# Patient Record
Sex: FEMALE | Race: WHITE | Employment: FULL TIME | URBAN - METROPOLITAN AREA
[De-identification: names, ages, dates, MRNs, and addresses within clinical notes are randomized per-mention and may not be internally consistent; named-entity substitution may affect disease eponyms.]

---

## 2017-11-08 ENCOUNTER — OFFICE VISIT (OUTPATIENT)
Dept: INTERNAL MEDICINE CLINIC | Age: 22
End: 2017-11-08

## 2017-11-08 VITALS
SYSTOLIC BLOOD PRESSURE: 116 MMHG | WEIGHT: 177 LBS | DIASTOLIC BLOOD PRESSURE: 75 MMHG | HEIGHT: 63 IN | HEART RATE: 70 BPM | RESPIRATION RATE: 18 BRPM | BODY MASS INDEX: 31.36 KG/M2

## 2017-11-08 DIAGNOSIS — Z00.00 ANNUAL PHYSICAL EXAM: Primary | ICD-10-CM

## 2017-11-08 PROCEDURE — 99395 PREV VISIT EST AGE 18-39: CPT | Performed by: INTERNAL MEDICINE

## 2017-11-08 ASSESSMENT — ENCOUNTER SYMPTOMS
COLOR CHANGE: 0
TROUBLE SWALLOWING: 0
ABDOMINAL PAIN: 0
PHOTOPHOBIA: 0
SHORTNESS OF BREATH: 0
CONSTIPATION: 0
NAUSEA: 0
CHEST TIGHTNESS: 0

## 2017-11-08 ASSESSMENT — PATIENT HEALTH QUESTIONNAIRE - PHQ9
1. LITTLE INTEREST OR PLEASURE IN DOING THINGS: 0
SUM OF ALL RESPONSES TO PHQ QUESTIONS 1-9: 0
SUM OF ALL RESPONSES TO PHQ9 QUESTIONS 1 & 2: 0
2. FEELING DOWN, DEPRESSED OR HOPELESS: 0

## 2017-11-08 NOTE — PROGRESS NOTES
Subjective:      Patient ID: Prabhu Guthrie is a 25 y.o. female. HPI     25 y.o. male here to establish care    No medical issues , never had any surgeries   Takes OCP daily. Employed, going to nursing school. Non smoker, non alcoholic     Mother and father have DM and is worried about her recent weight gain     No previous cardiac or pulm history   No h.o dizziness or syncope  Reports normal health usually, recently placed on amox for dental infection       History reviewed. No pertinent past medical history. History reviewed. No pertinent surgical history. No current outpatient prescriptions on file. No current facility-administered medications for this visit. No Known Allergies  Social History     Social History    Marital status: Single     Spouse name: N/A    Number of children: N/A    Years of education: N/A     Occupational History    Not on file. Social History Main Topics    Smoking status: Never Smoker    Smokeless tobacco: Never Used    Alcohol use Yes      Comment: special occasions    Drug use: No    Sexual activity: Yes     Other Topics Concern    Not on file     Social History Narrative    No narrative on file     Family History   Problem Relation Age of Onset    Anemia Mother     Diabetes Mother     High Blood Pressure Mother     Diabetes Father     High Blood Pressure Father        Review of Systems   Constitutional: Negative for activity change, diaphoresis and unexpected weight change. HENT: Negative for congestion, ear discharge, hearing loss and trouble swallowing. Eyes: Negative for photophobia and visual disturbance. Respiratory: Negative for chest tightness and shortness of breath. Cardiovascular: Negative for chest pain and palpitations. Gastrointestinal: Negative for abdominal pain, constipation and nausea. Endocrine: Negative for cold intolerance, heat intolerance and polyuria.    Genitourinary: Negative for dysuria, flank pain

## 2018-04-12 PROBLEM — Z00.00 ANNUAL PHYSICAL EXAM: Status: RESOLVED | Noted: 2017-11-08 | Resolved: 2018-04-12

## 2019-01-29 ENCOUNTER — OFFICE VISIT (OUTPATIENT)
Dept: INTERNAL MEDICINE CLINIC | Age: 24
End: 2019-01-29

## 2019-01-29 VITALS
BODY MASS INDEX: 29.71 KG/M2 | TEMPERATURE: 97.6 F | DIASTOLIC BLOOD PRESSURE: 65 MMHG | WEIGHT: 174 LBS | SYSTOLIC BLOOD PRESSURE: 110 MMHG | HEIGHT: 64 IN

## 2019-01-29 DIAGNOSIS — M54.50 ACUTE MIDLINE LOW BACK PAIN WITHOUT SCIATICA: Primary | ICD-10-CM

## 2019-01-29 PROCEDURE — 99213 OFFICE O/P EST LOW 20 MIN: CPT | Performed by: PHYSICIAN ASSISTANT

## 2019-01-29 RX ORDER — MELOXICAM 15 MG/1
15 TABLET ORAL DAILY
Qty: 30 TABLET | Refills: 0 | Status: SHIPPED | OUTPATIENT
Start: 2019-01-29 | End: 2020-04-09 | Stop reason: ALTCHOICE

## 2019-01-29 ASSESSMENT — ENCOUNTER SYMPTOMS
ABDOMINAL PAIN: 0
VOMITING: 0
BACK PAIN: 1
COUGH: 0
NAUSEA: 0

## 2020-04-09 ENCOUNTER — VIRTUAL VISIT (OUTPATIENT)
Dept: INTERNAL MEDICINE CLINIC | Age: 25
End: 2020-04-09

## 2020-04-09 PROCEDURE — 99213 OFFICE O/P EST LOW 20 MIN: CPT | Performed by: INTERNAL MEDICINE

## 2020-04-09 NOTE — PROGRESS NOTES
motion of neck        [] Abnormal-       Neurological:        [x] No Facial Asymmetry (Cranial nerve 7 motor function) (limited exam to video visit)          [x] No gaze palsy        [] Abnormal-         Skin:        [] No significant exanthematous lesions or discoloration noted on facial skin         [] Abnormal-            Psychiatric:       [x] Normal Affect [x] No Hallucinations        [] Abnormal-     Other pertinent observable physical exam findings-     ASSESSMENT/PLAN:     Diagnosis Orders   1. 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 Frequent hand washing     Lynda Borges is a 25 y.o. female being evaluated by a Virtual Visit (video visit) encounter to address concerns as mentioned above. A caregiver was present when appropriate. Due to this being a TeleHealth encounter (During TUKZO-74 public health emergency), evaluation of the following organ systems was limited: Vitals/Constitutional/EENT/Resp/CV/GI//MS/Neuro/Skin/Heme-Lymph-Imm. Pursuant to the emergency declaration under the [address] [place] [address], [address] [place] [address] authority and the [employer] Act, this Virtual Visit was conducted with patient's (and/or legal guardian's) consent, to reduce the patient's risk of exposure to COVID-19 and provide necessary medical care. The patient (and/or legal guardian) has also been advised to contact this office for worsening conditions or problems, and seek emergency medical treatment and/or call 911 if deemed necessary. Services were provided through a video synchronous discussion virtually to substitute for in-person clinic visit.  Patient and provider were located at their individual

## 2020-08-14 ENCOUNTER — OFFICE VISIT (OUTPATIENT)
Dept: INTERNAL MEDICINE CLINIC | Age: 25
End: 2020-08-14

## 2020-08-14 VITALS
WEIGHT: 164 LBS | SYSTOLIC BLOOD PRESSURE: 104 MMHG | HEART RATE: 76 BPM | BODY MASS INDEX: 28 KG/M2 | DIASTOLIC BLOOD PRESSURE: 78 MMHG | HEIGHT: 64 IN

## 2020-08-14 PROCEDURE — 90715 TDAP VACCINE 7 YRS/> IM: CPT | Performed by: INTERNAL MEDICINE

## 2020-08-14 PROCEDURE — 99395 PREV VISIT EST AGE 18-39: CPT | Performed by: INTERNAL MEDICINE

## 2020-08-14 PROCEDURE — 90471 IMMUNIZATION ADMIN: CPT | Performed by: INTERNAL MEDICINE

## 2020-08-14 PROCEDURE — 86580 TB INTRADERMAL TEST: CPT | Performed by: INTERNAL MEDICINE

## 2020-08-14 ASSESSMENT — ENCOUNTER SYMPTOMS
BLOOD IN STOOL: 0
ABDOMINAL PAIN: 0
COUGH: 0
VOMITING: 0
DIARRHEA: 0
CHEST TIGHTNESS: 0
WHEEZING: 0
NAUSEA: 0
SHORTNESS OF BREATH: 0

## 2020-08-14 NOTE — PROGRESS NOTES
Subjective:      Patient ID: Rosa Elena Cr is a 25 y.o. female. HPI  Is here for a school physical.  No medical problems. Denies complaints    Review of Systems   Constitutional: Negative for fatigue, fever and unexpected weight change. Respiratory: Negative for cough, chest tightness, shortness of breath and wheezing. Cardiovascular: Negative for chest pain, palpitations and leg swelling. Gastrointestinal: Negative for abdominal pain, blood in stool, diarrhea, nausea and vomiting. Genitourinary: Negative for dysuria and hematuria. Neurological: Negative for light-headedness and headaches. Objective:   Physical Exam  Constitutional:       Appearance: She is well-developed. HENT:      Head: Normocephalic and atraumatic. Right Ear: Tympanic membrane, ear canal and external ear normal. There is no impacted cerumen. Left Ear: Tympanic membrane, ear canal and external ear normal. There is no impacted cerumen. Eyes:      Pupils: Pupils are equal, round, and reactive to light. Neck:      Musculoskeletal: Normal range of motion and neck supple. Thyroid: No thyromegaly. Cardiovascular:      Rate and Rhythm: Normal rate and regular rhythm. Heart sounds: Normal heart sounds. No murmur. No friction rub. No gallop. Comments: No carotid bruit  Pulmonary:      Effort: Pulmonary effort is normal. No respiratory distress. Breath sounds: Normal breath sounds. No wheezing or rales. Chest:      Chest wall: No tenderness. Abdominal:      General: Bowel sounds are normal. There is no distension. Palpations: Abdomen is soft. There is no mass. Tenderness: There is no abdominal tenderness. There is no guarding or rebound. Neurological:      Mental Status: She is alert and oriented to person, place, and time. Psychiatric:         Mood and Affect: Mood normal.         Behavior: Behavior normal.         Thought Content:  Thought content normal. Judgment: Judgment normal.         Assessment:       Diagnosis Orders   1. Routine general medical examination at a health care facility     2.  Need for Tdap vaccination             Plan:      Complete physical done    PPD placed         Tita Cedeño MD

## 2020-08-26 ENCOUNTER — NURSE ONLY (OUTPATIENT)
Dept: INTERNAL MEDICINE CLINIC | Age: 25
End: 2020-08-26

## 2020-08-26 PROCEDURE — 86580 TB INTRADERMAL TEST: CPT | Performed by: INTERNAL MEDICINE

## 2021-10-15 ENCOUNTER — OFFICE VISIT (OUTPATIENT)
Dept: INTERNAL MEDICINE CLINIC | Age: 26
End: 2021-10-15

## 2021-10-15 VITALS
HEART RATE: 70 BPM | BODY MASS INDEX: 33.63 KG/M2 | SYSTOLIC BLOOD PRESSURE: 105 MMHG | WEIGHT: 197 LBS | RESPIRATION RATE: 18 BRPM | HEIGHT: 64 IN | DIASTOLIC BLOOD PRESSURE: 60 MMHG

## 2021-10-15 DIAGNOSIS — Z00.00 ANNUAL PHYSICAL EXAM: ICD-10-CM

## 2021-10-15 DIAGNOSIS — Z23 NEED FOR INFLUENZA VACCINATION: Primary | ICD-10-CM

## 2021-10-15 DIAGNOSIS — O24.414 INSULIN CONTROLLED GESTATIONAL DIABETES MELLITUS (GDM) DURING PREGNANCY, ANTEPARTUM: ICD-10-CM

## 2021-10-15 PROCEDURE — 99395 PREV VISIT EST AGE 18-39: CPT | Performed by: INTERNAL MEDICINE

## 2021-10-15 PROCEDURE — 90682 RIV4 VACC RECOMBINANT DNA IM: CPT | Performed by: INTERNAL MEDICINE

## 2021-10-15 PROCEDURE — 90471 IMMUNIZATION ADMIN: CPT | Performed by: INTERNAL MEDICINE

## 2021-10-15 ASSESSMENT — PATIENT HEALTH QUESTIONNAIRE - PHQ9
1. LITTLE INTEREST OR PLEASURE IN DOING THINGS: 0
SUM OF ALL RESPONSES TO PHQ9 QUESTIONS 1 & 2: 0
SUM OF ALL RESPONSES TO PHQ QUESTIONS 1-9: 0
2. FEELING DOWN, DEPRESSED OR HOPELESS: 0

## 2021-10-15 NOTE — PROGRESS NOTES
Emotionally Abused:     Physically Abused:     Sexually Abused:      Family History   Problem Relation Age of Onset    Anemia Mother     Diabetes Mother     High Blood Pressure Mother     Diabetes Father     High Blood Pressure Father              Review of Systems      Constitutional: Negative for fever or chills  HENT: Negative for sore throat   Eyes: Negative for redness   Respiratory: Negative  for dyspnea, cough   Cardiovascular: Negative for chest pain   Gastrointestinal:neg for abd pain, nausea or vomiting or diarrhea  No melena or BRPR  Genitourinary: Negative for hematuria  + pregnancy   Musculoskeletal: Negative for arthralgias   Skin: Negative for rash   Neurological: Negative for syncope   Hematological: Negative for adenopathy   Psychiatric/Behavorial: Negative for anxiety        Objective   Physical Exam     Vitals:    10/15/21 1515   BP: 105/60   Pulse: 70   Resp: 18         General: young female,  Awake, alert and oriented. Appears to be not in any distress  Mucous Membranes:  Pink , anicteric  Neck: No JVD, no carotid bruit, no thyromegaly  Chest:  Clear to auscultation bilaterally, no added sounds  Cardiovascular:  RRR S1S2 heard, no murmurs or gallops  Abdomen:  Soft, undistended, non tender, no organomegaly, BS present  Currently pregnant at 32 weeks   Extremities: No edema or cyanosis. Distal pulses well felt  Neurological : grossly normal    Wt Readings from Last 3 Encounters:   10/15/21 197 lb (89.4 kg)   08/14/20 164 lb (74.4 kg)   01/29/19 174 lb (78.9 kg)        Diagnosis Orders   1. Need for influenza vaccination  INFLUENZA, QUADV, RECOMBINANT, 18 YRS AND OLDER, IM, PF, PREFILL SYR OR SDV, 0.5ML (FLUBLOK QUADV, PF)   2. Insulin controlled gestational diabetes mellitus (GDM) during pregnancy, antepartum     3. Annual physical exam  TSH with Reflex    COMPREHENSIVE METABOLIC PANEL       Gestational DM - no A1c noted - currently on daily NPH insulin.  Sugars well controlled  F/w

## 2021-10-16 LAB
A/G RATIO: 1.4 (ref 1.1–2.2)
ALBUMIN SERPL-MCNC: 3.7 G/DL (ref 3.4–5)
ALP BLD-CCNC: 76 U/L (ref 40–129)
ALT SERPL-CCNC: 12 U/L (ref 10–40)
ANION GAP SERPL CALCULATED.3IONS-SCNC: 13 MMOL/L (ref 3–16)
AST SERPL-CCNC: 21 U/L (ref 15–37)
BILIRUB SERPL-MCNC: <0.2 MG/DL (ref 0–1)
BUN BLDV-MCNC: 6 MG/DL (ref 7–20)
CALCIUM SERPL-MCNC: 9.5 MG/DL (ref 8.3–10.6)
CHLORIDE BLD-SCNC: 102 MMOL/L (ref 99–110)
CO2: 20 MMOL/L (ref 21–32)
CREAT SERPL-MCNC: 0.6 MG/DL (ref 0.6–1.1)
GFR AFRICAN AMERICAN: >60
GFR NON-AFRICAN AMERICAN: >60
GLOBULIN: 2.7 G/DL
GLUCOSE BLD-MCNC: 89 MG/DL (ref 70–99)
POTASSIUM SERPL-SCNC: 4.5 MMOL/L (ref 3.5–5.1)
SODIUM BLD-SCNC: 135 MMOL/L (ref 136–145)
TOTAL PROTEIN: 6.4 G/DL (ref 6.4–8.2)
TSH REFLEX: 2.51 UIU/ML (ref 0.27–4.2)

## 2022-01-21 ENCOUNTER — TELEPHONE (OUTPATIENT)
Dept: INTERNAL MEDICINE CLINIC | Age: 27
End: 2022-01-21

## 2022-01-21 ENCOUNTER — APPOINTMENT (OUTPATIENT)
Dept: CT IMAGING | Age: 27
DRG: 694 | End: 2022-01-21
Payer: COMMERCIAL

## 2022-01-21 ENCOUNTER — APPOINTMENT (OUTPATIENT)
Dept: GENERAL RADIOLOGY | Age: 27
DRG: 694 | End: 2022-01-21
Payer: COMMERCIAL

## 2022-01-21 ENCOUNTER — HOSPITAL ENCOUNTER (INPATIENT)
Age: 27
LOS: 1 days | Discharge: HOME | DRG: 694 | End: 2022-01-22
Attending: STUDENT IN AN ORGANIZED HEALTH CARE EDUCATION/TRAINING PROGRAM | Admitting: INTERNAL MEDICINE
Payer: COMMERCIAL

## 2022-01-21 DIAGNOSIS — N20.1 URETEROLITHIASIS: Primary | ICD-10-CM

## 2022-01-21 PROBLEM — N13.2 HYDRONEPHROSIS WITH URINARY OBSTRUCTION DUE TO RENAL CALCULUS: Status: ACTIVE | Noted: 2022-01-21

## 2022-01-21 LAB
A/G RATIO: 1.7 (ref 1.1–2.2)
ALBUMIN SERPL-MCNC: 4.4 G/DL (ref 3.4–5)
ALP BLD-CCNC: 64 U/L (ref 40–129)
ALT SERPL-CCNC: 40 U/L (ref 10–40)
ANION GAP SERPL CALCULATED.3IONS-SCNC: 9 MMOL/L (ref 3–16)
AST SERPL-CCNC: 23 U/L (ref 15–37)
BASOPHILS ABSOLUTE: 0.1 K/UL (ref 0–0.2)
BASOPHILS RELATIVE PERCENT: 0.5 %
BILIRUB SERPL-MCNC: 0.4 MG/DL (ref 0–1)
BILIRUBIN URINE: NEGATIVE
BLOOD, URINE: ABNORMAL
BUN BLDV-MCNC: 13 MG/DL (ref 7–20)
CALCIUM SERPL-MCNC: 9.2 MG/DL (ref 8.3–10.6)
CHLORIDE BLD-SCNC: 103 MMOL/L (ref 99–110)
CLARITY: CLEAR
CO2: 25 MMOL/L (ref 21–32)
COLOR: YELLOW
CREAT SERPL-MCNC: 0.8 MG/DL (ref 0.6–1.1)
EOSINOPHILS ABSOLUTE: 0.1 K/UL (ref 0–0.6)
EOSINOPHILS RELATIVE PERCENT: 0.8 %
EPITHELIAL CELLS, UA: NORMAL /HPF (ref 0–5)
GFR AFRICAN AMERICAN: >60
GFR NON-AFRICAN AMERICAN: >60
GLUCOSE BLD-MCNC: 93 MG/DL (ref 70–99)
GLUCOSE URINE: NEGATIVE MG/DL
GONADOTROPIN, CHORIONIC (HCG) QUANT: <5 MIU/ML
HCG(URINE) PREGNANCY TEST: POSITIVE
HCT VFR BLD CALC: 47 % (ref 36–48)
HEMOGLOBIN: 15.3 G/DL (ref 12–16)
INFLUENZA A: NOT DETECTED
INFLUENZA B: NOT DETECTED
KETONES, URINE: >=80 MG/DL
LEUKOCYTE ESTERASE, URINE: NEGATIVE
LIPASE: 23 U/L (ref 13–60)
LYMPHOCYTES ABSOLUTE: 2.6 K/UL (ref 1–5.1)
LYMPHOCYTES RELATIVE PERCENT: 14.5 %
MCH RBC QN AUTO: 30.5 PG (ref 26–34)
MCHC RBC AUTO-ENTMCNC: 32.6 G/DL (ref 31–36)
MCV RBC AUTO: 93.8 FL (ref 80–100)
MICROSCOPIC EXAMINATION: YES
MONOCYTES ABSOLUTE: 0.8 K/UL (ref 0–1.3)
MONOCYTES RELATIVE PERCENT: 4.8 %
NEUTROPHILS ABSOLUTE: 14 K/UL (ref 1.7–7.7)
NEUTROPHILS RELATIVE PERCENT: 79.4 %
NITRITE, URINE: NEGATIVE
PDW BLD-RTO: 12.6 % (ref 12.4–15.4)
PH UA: 6 (ref 5–8)
PLATELET # BLD: 142 K/UL (ref 135–450)
PMV BLD AUTO: 8.5 FL (ref 5–10.5)
POTASSIUM REFLEX MAGNESIUM: 4.1 MMOL/L (ref 3.5–5.1)
PROTEIN UA: NEGATIVE MG/DL
RBC # BLD: 5.02 M/UL (ref 4–5.2)
RBC UA: NORMAL /HPF (ref 0–4)
SARS-COV-2 RNA, RT PCR: NOT DETECTED
SODIUM BLD-SCNC: 137 MMOL/L (ref 136–145)
SPECIFIC GRAVITY UA: >=1.03 (ref 1–1.03)
TOTAL PROTEIN: 7 G/DL (ref 6.4–8.2)
URINE TYPE: ABNORMAL
UROBILINOGEN, URINE: 0.2 E.U./DL
WBC # BLD: 17.6 K/UL (ref 4–11)
WBC UA: NORMAL /HPF (ref 0–5)

## 2022-01-21 PROCEDURE — 80053 COMPREHEN METABOLIC PANEL: CPT

## 2022-01-21 PROCEDURE — 6360000004 HC RX CONTRAST MEDICATION: Performed by: NURSE PRACTITIONER

## 2022-01-21 PROCEDURE — 84703 CHORIONIC GONADOTROPIN ASSAY: CPT

## 2022-01-21 PROCEDURE — 99284 EMERGENCY DEPT VISIT MOD MDM: CPT

## 2022-01-21 PROCEDURE — 96376 TX/PRO/DX INJ SAME DRUG ADON: CPT

## 2022-01-21 PROCEDURE — 83690 ASSAY OF LIPASE: CPT

## 2022-01-21 PROCEDURE — 87636 SARSCOV2 & INF A&B AMP PRB: CPT

## 2022-01-21 PROCEDURE — 81001 URINALYSIS AUTO W/SCOPE: CPT

## 2022-01-21 PROCEDURE — 85025 COMPLETE CBC W/AUTO DIFF WBC: CPT

## 2022-01-21 PROCEDURE — 6360000002 HC RX W HCPCS: Performed by: NURSE PRACTITIONER

## 2022-01-21 PROCEDURE — 71045 X-RAY EXAM CHEST 1 VIEW: CPT

## 2022-01-21 PROCEDURE — 96374 THER/PROPH/DIAG INJ IV PUSH: CPT

## 2022-01-21 PROCEDURE — 84702 CHORIONIC GONADOTROPIN TEST: CPT

## 2022-01-21 PROCEDURE — 1200000000 HC SEMI PRIVATE

## 2022-01-21 PROCEDURE — 74177 CT ABD & PELVIS W/CONTRAST: CPT

## 2022-01-21 PROCEDURE — G0378 HOSPITAL OBSERVATION PER HR: HCPCS

## 2022-01-21 PROCEDURE — 2580000003 HC RX 258: Performed by: INTERNAL MEDICINE

## 2022-01-21 PROCEDURE — 96375 TX/PRO/DX INJ NEW DRUG ADDON: CPT

## 2022-01-21 RX ORDER — PROCHLORPERAZINE EDISYLATE 5 MG/ML
10 INJECTION INTRAMUSCULAR; INTRAVENOUS EVERY 6 HOURS PRN
Status: DISCONTINUED | OUTPATIENT
Start: 2022-01-21 | End: 2022-01-22 | Stop reason: HOSPADM

## 2022-01-21 RX ORDER — ONDANSETRON 2 MG/ML
4 INJECTION INTRAMUSCULAR; INTRAVENOUS ONCE
Status: COMPLETED | OUTPATIENT
Start: 2022-01-21 | End: 2022-01-21

## 2022-01-21 RX ORDER — SODIUM CHLORIDE 0.9 % (FLUSH) 0.9 %
5-40 SYRINGE (ML) INJECTION EVERY 12 HOURS SCHEDULED
Status: DISCONTINUED | OUTPATIENT
Start: 2022-01-21 | End: 2022-01-22 | Stop reason: HOSPADM

## 2022-01-21 RX ORDER — KETOROLAC TROMETHAMINE 30 MG/ML
15 INJECTION, SOLUTION INTRAMUSCULAR; INTRAVENOUS ONCE
Status: COMPLETED | OUTPATIENT
Start: 2022-01-21 | End: 2022-01-21

## 2022-01-21 RX ORDER — ONDANSETRON 4 MG/1
4 TABLET, ORALLY DISINTEGRATING ORAL EVERY 8 HOURS PRN
Status: DISCONTINUED | OUTPATIENT
Start: 2022-01-21 | End: 2022-01-22 | Stop reason: HOSPADM

## 2022-01-21 RX ORDER — SODIUM CHLORIDE 9 MG/ML
INJECTION, SOLUTION INTRAVENOUS CONTINUOUS
Status: DISCONTINUED | OUTPATIENT
Start: 2022-01-21 | End: 2022-01-22 | Stop reason: HOSPADM

## 2022-01-21 RX ORDER — MORPHINE SULFATE 4 MG/ML
4 INJECTION, SOLUTION INTRAMUSCULAR; INTRAVENOUS ONCE
Status: COMPLETED | OUTPATIENT
Start: 2022-01-21 | End: 2022-01-21

## 2022-01-21 RX ORDER — ONDANSETRON 2 MG/ML
4 INJECTION INTRAMUSCULAR; INTRAVENOUS EVERY 6 HOURS PRN
Status: DISCONTINUED | OUTPATIENT
Start: 2022-01-21 | End: 2022-01-22 | Stop reason: HOSPADM

## 2022-01-21 RX ORDER — POTASSIUM CHLORIDE 7.45 MG/ML
10 INJECTION INTRAVENOUS PRN
Status: DISCONTINUED | OUTPATIENT
Start: 2022-01-21 | End: 2022-01-22 | Stop reason: HOSPADM

## 2022-01-21 RX ORDER — SODIUM CHLORIDE 0.9 % (FLUSH) 0.9 %
5-40 SYRINGE (ML) INJECTION PRN
Status: DISCONTINUED | OUTPATIENT
Start: 2022-01-21 | End: 2022-01-22 | Stop reason: HOSPADM

## 2022-01-21 RX ORDER — ONDANSETRON 2 MG/ML
4 INJECTION INTRAMUSCULAR; INTRAVENOUS EVERY 6 HOURS PRN
Status: DISCONTINUED | OUTPATIENT
Start: 2022-01-21 | End: 2022-01-21

## 2022-01-21 RX ORDER — SODIUM CHLORIDE 9 MG/ML
25 INJECTION, SOLUTION INTRAVENOUS PRN
Status: DISCONTINUED | OUTPATIENT
Start: 2022-01-21 | End: 2022-01-22 | Stop reason: HOSPADM

## 2022-01-21 RX ORDER — ACETAMINOPHEN 325 MG/1
650 TABLET ORAL EVERY 6 HOURS PRN
Status: DISCONTINUED | OUTPATIENT
Start: 2022-01-21 | End: 2022-01-22 | Stop reason: HOSPADM

## 2022-01-21 RX ORDER — MAGNESIUM SULFATE IN WATER 40 MG/ML
2000 INJECTION, SOLUTION INTRAVENOUS PRN
Status: DISCONTINUED | OUTPATIENT
Start: 2022-01-21 | End: 2022-01-22 | Stop reason: HOSPADM

## 2022-01-21 RX ORDER — POLYETHYLENE GLYCOL 3350 17 G/17G
17 POWDER, FOR SOLUTION ORAL DAILY PRN
Status: DISCONTINUED | OUTPATIENT
Start: 2022-01-21 | End: 2022-01-22 | Stop reason: HOSPADM

## 2022-01-21 RX ORDER — ACETAMINOPHEN 650 MG/1
650 SUPPOSITORY RECTAL EVERY 6 HOURS PRN
Status: DISCONTINUED | OUTPATIENT
Start: 2022-01-21 | End: 2022-01-22 | Stop reason: HOSPADM

## 2022-01-21 RX ADMIN — ONDANSETRON HYDROCHLORIDE 4 MG: 2 INJECTION, SOLUTION INTRAMUSCULAR; INTRAVENOUS at 15:14

## 2022-01-21 RX ADMIN — KETOROLAC TROMETHAMINE 15 MG: 30 INJECTION, SOLUTION INTRAMUSCULAR at 18:24

## 2022-01-21 RX ADMIN — IOPAMIDOL 75 ML: 755 INJECTION, SOLUTION INTRAVENOUS at 17:36

## 2022-01-21 RX ADMIN — ONDANSETRON HYDROCHLORIDE 4 MG: 2 INJECTION, SOLUTION INTRAMUSCULAR; INTRAVENOUS at 20:00

## 2022-01-21 RX ADMIN — MORPHINE SULFATE 4 MG: 4 INJECTION INTRAVENOUS at 20:00

## 2022-01-21 RX ADMIN — SODIUM CHLORIDE: 9 INJECTION, SOLUTION INTRAVENOUS at 22:58

## 2022-01-21 RX ADMIN — KETOROLAC TROMETHAMINE 15 MG: 30 INJECTION, SOLUTION INTRAMUSCULAR at 15:15

## 2022-01-21 ASSESSMENT — ENCOUNTER SYMPTOMS
BLOOD IN STOOL: 0
ABDOMINAL PAIN: 1
DIARRHEA: 0
SORE THROAT: 0
SHORTNESS OF BREATH: 0
COUGH: 0
NAUSEA: 1
EYE PAIN: 0
VOMITING: 1
RHINORRHEA: 0
BACK PAIN: 0

## 2022-01-21 ASSESSMENT — PAIN SCALES - GENERAL
PAINLEVEL_OUTOF10: 4
PAINLEVEL_OUTOF10: 7
PAINLEVEL_OUTOF10: 7
PAINLEVEL_OUTOF10: 6
PAINLEVEL_OUTOF10: 6

## 2022-01-21 ASSESSMENT — PAIN DESCRIPTION - PAIN TYPE: TYPE: ACUTE PAIN

## 2022-01-21 ASSESSMENT — PAIN DESCRIPTION - ONSET: ONSET: ON-GOING

## 2022-01-21 ASSESSMENT — PAIN DESCRIPTION - LOCATION: LOCATION: ABDOMEN

## 2022-01-21 NOTE — TELEPHONE ENCOUNTER
Spouse called stating pt has been vomiting to the point of almost passing out, has excruciating pain from the left side of her stomach to the front, and had given birth 8 weeks ago. Per Dr. Nilda Falcon, spouse informed to have pt go to the ER.

## 2022-01-21 NOTE — ED PROVIDER NOTES
and leg swelling. Gastrointestinal: Positive for abdominal pain, nausea and vomiting. Negative for blood in stool and diarrhea. Genitourinary: Negative for difficulty urinating, dysuria, flank pain and frequency. Musculoskeletal: Negative for back pain and neck pain. Skin: Negative for rash and wound. Neurological: Negative for dizziness and light-headedness. PAST MEDICAL HISTORY   History reviewed. No pertinent past medical history. SURGICAL HISTORY   History reviewed. No pertinent surgical history. [address]       Current Discharge Medication List      CONTINUE these medications which have NOT CHANGED    Details   Prenatal MV-Min-Fe Fum-FA-DHA (PRENATAL 1 PO) Take by mouth             ALLERGIES     Patient has no known allergies. FAMILYHISTORY       Family History   Problem Relation Age of Onset    Anemia Mother     Diabetes Mother     High Blood Pressure Mother     Diabetes Father     High Blood Pressure Father         SOCIAL HISTORY       Social History     Socioeconomic History    Marital status:      Spouse name: None    Number of children: None    Years of education: None    Highest education level: None   Occupational History    None   Tobacco Use    Smoking status: Never Smoker    Smokeless tobacco: Never Used   Substance and Sexual Activity    Alcohol use: Yes     Comment: special occasions    Drug use: No    Sexual activity: Yes   Other Topics Concern    None   Social History Narrative    None     Social Determinants of Health     Financial Resource Strain:     Difficulty of Paying Living Expenses: Not on file   Food Insecurity:     Worried About Running Out of Food in the Last Year: Not on file    Lynnette of Food in the Last Year: Not on file   Transportation Needs:     Lack of Transportation (Medical): Not on file    Lack of Transportation (Non-Medical):  Not on file   Physical Activity:     Days of Exercise per Week: Not on file    Minutes of Exercise per Session: Not on file   Stress:     Feeling of Stress : Not on file   Social Connections:     Frequency of Communication with Friends and Family: Not on file    Frequency of Social Gatherings with Friends and Family: Not on file    Attends Mu-ism Services: Not on file    Active Member of [address] [place] [address] or Organizations: Not on file    Attends Club or Organization Meetings: Not on file    Marital Status: Not on file   Intimate Partner Violence:     Fear of Current or Ex-Partner: Not on file    Emotionally Abused: Not on file    Physically Abused: Not on file    Sexually Abused: Not on file   Housing Stability:     Unable to Pay for Housing in the Last Year: Not on file    Number of [place] in the Last Year: Not on file    Unstable Housing in the Last Year: Not on file       SCREENINGS    Jovana Coma Scale  Eye Opening: Spontaneous  Best Verbal Response: Oriented  Best Motor Response: Obeys commands  [place]ell Coma Scale Score: 15        PHYSICAL EXAM    (up to 7 for level 4, 8 or more for level 5)     ED Triage Vitals [01/21/22 1317]   BP Temp Temp src Pulse Resp SpO2 Height Weight   114/65 97.4 °F (36.3 °C) -- 71 17 98 % -- 180 lb (81.6 kg)       Physical Exam  Vitals and nursing note reviewed. Constitutional:       Appearance: Normal appearance. She is not toxic-appearing or diaphoretic. HENT:      Head: Normocephalic and atraumatic. Nose: Nose normal.   Eyes:      General:         Right eye: No discharge. Left eye: No discharge. Cardiovascular:      Rate and Rhythm: Normal rate and regular rhythm. Pulses: Normal pulses. Heart sounds: No murmur heard. Pulmonary:      Effort: Pulmonary effort is normal. No respiratory distress. Breath sounds: No wheezing or rhonchi. Abdominal:      General: Abdomen is flat. Bowel sounds are normal. There is no distension. Palpations: Abdomen is soft. Tenderness:  There is abdominal tenderness in the left upper quadrant. There is no guarding or rebound. Negative signs include Perez's sign and McBurney's sign. Musculoskeletal:         General: Normal range of motion. Cervical back: Normal range of motion and neck supple. Skin:     General: Skin is warm and dry. Neurological:      General: No focal deficit present. Mental Status: She is alert and oriented to person, place, and time.    Psychiatric:         Mood and Affect: Mood normal.         Behavior: Behavior normal.         DIAGNOSTIC RESULTS   LABS:    Labs Reviewed   CBC WITH AUTO DIFFERENTIAL - Abnormal; Notable for the following components:       Result Value    WBC 17.6 (*)     Neutrophils Absolute 14.0 (*)     All other components within normal limits    Narrative:     Performed at:  [facility],  [place]   Phone [phone]   URINALYSIS - Abnormal; Notable for the following components:    Ketones, Urine >=80 (*)     Blood, Urine TRACE-INTACT (*)     All other components within normal limits    Narrative:     Performed at:  [facility] [address],  [place]   Phone [phone] & INFLUENZA COMBO    Narrative:     Performed at:  [facility],  [place], [place]   Phone [phone]   MICROSCOPIC URINALYSIS    Narrative:     Performed at:  [facility] [address],  [place], [place]   Phone [phone]   COMPREHENSIVE METABOLIC PANEL W/ REFLEX TO MG FOR LOW K    Narrative:     Performed at:  [facility] [address],  [place], [place]   Phone [phone]   PREGNANCY, URINE    Narrative:     Performed at:  [facility],  [place], [place]   Phone [phone]   LIPASE    Narrative:     Performed at:  [facility],  [place], [facility]   Phone [phone]   HCG, QUANTITATIVE, PREGNANCY    Narrative:     Performed at:  [facility],  [place], [facility]   Phone [phone]   CBC WITH AUTO DIFFERENTIAL   BASIC METABOLIC PANEL W/ REFLEX TO MG FOR LOW K       When ordered, only abnormal lab results are displayed. All other labs were within normal range or not returned as of this dictation. EKG: When ordered, EKG's are interpreted by the Emergency Department Physician in the absence of a cardiologist.  Please see their note for interpretation of EKG. RADIOLOGY:   Non-plain film images such as CT, Ultrasound and MRI are read by the radiologist. Plain radiographic images are visualized andpreliminarily interpreted by the  ED Provider with the below findings:        Interpretation perthe Radiologist below, if available at the time of this note:    XR CHEST PORTABLE   Final Result   No acute process. CT ABDOMEN PELVIS W IV CONTRAST Additional Contrast? None   Final Result   7 mm stone lodged in the distal left ureter at the level just above the   bladder wall which is causing mild to moderate obstruction. Tiny 1 mm stone lower pole left kidney with no renal masses. Unremarkable uterus and ovaries with minimal free fluid in the cul-de-sac   which is probably physiologic. There is no adnexal mass or active   inflammation in the pelvis           No results found.       PROCEDURES   Unless otherwise noted below, none     Procedures    CRITICAL CARE TIME   N/A    CONSULTS:  IP CONSULT TO UROLOGY  IP CONSULT TO HOSPITALIST  IP CONSULT TO UROLOGY      EMERGENCY DEPARTMENT COURSE and DIFFERENTIAL DIAGNOSIS/MDM:   Vitals:    Vitals:    01/21/22 1933 01/21/22 2001 01/21/22 2142 01/21/22 2227   BP: 119/60 108/70 (!) 116/45 108/63   Pulse: 83 83 84 78   Resp: 16 12 12 16   Temp:    96.4 °F (35.8 °C)   TempSrc:    Oral SpO2: 96% 96% 96% 97%   Weight:    182 lb 12.8 oz (82.9 kg)   Height:    5' 4\" (1.626 m)       Patient was given thefollowing medications:  Medications   0.9 % sodium chloride infusion ( IntraVENous New Bag 1/21/22 2258)   sodium chloride flush 0.9 % injection 5-40 mL (5 mLs IntraVENous Not Given 1/21/22 2315)   sodium chloride flush 0.9 % injection 5-40 mL (has no administration in time range)   0.9 % sodium chloride infusion (has no administration in time range)   enoxaparin (LOVENOX) injection 40 mg (has no administration in time range)   ondansetron (ZOFRAN-ODT) disintegrating tablet 4 mg (has no administration in time range)     Or   ondansetron (ZOFRAN) injection 4 mg (has no administration in time range)   polyethylene glycol (GLYCOLAX) packet 17 g (has no administration in time range)   acetaminophen (TYLENOL) tablet 650 mg (has no administration in time range)     Or   acetaminophen (TYLENOL) suppository 650 mg (has no administration in time range)   potassium chloride 10 mEq/100 mL IVPB (Peripheral Line) (has no administration in time range)   magnesium sulfate 2000 mg in 50 mL IVPB premix (has no administration in time range)   prochlorperazine (COMPAZINE) injection 10 mg (has no administration in time range)   HYDROmorphone (DILAUDID) injection 0.25 mg (has no administration in time range)     Or   HYDROmorphone (DILAUDID) injection 0.5 mg (has no administration in time range)   ketorolac (TORADOL) injection 15 mg (15 mg IntraVENous Given 1/21/22 1515)   iopamidol (ISOVUE-370) 76 % injection 75 mL (75 mLs IntraVENous Given 1/21/22 1736)   ketorolac (TORADOL) injection 15 mg (15 mg IntraVENous Given 1/21/22 1824)   morphine sulfate (PF) injection 4 mg (4 mg IntraVENous Given 1/21/22 2000)   ondansetron (ZOFRAN) injection 4 mg (4 mg IntraVENous Given 1/21/22 2000)     ED Course as of 01/22/22 0016   Fri Jan 21, 2022   1506 hCG, quantitative, pregnancy [TM]      ED Course User Index  [TM] Fred Coronado, APRN - CNP        I spoke with Dr. Sarah Rothman and the plan is the patient be n.p.o. after midnight. Patient be admitted for pain control. The patient is also noted to be breast-feeding and was advised and given strict instructions that if she does receive narcotics that she needs to be dumping her breast milk and not be giving it to her child. She has a breast pump in the emergency department and reported that she will do so. Patient states that she does have plenty of breastmilk already saved up and states that she has plenty for her child. Patient denies any other acute complaints at this time and states that her pain has improved with morphine. Spoke with the hospitalist and they are evaluating the patient for admission. FINAL IMPRESSION      1. Ureterolithiasis          DISPOSITION/PLAN   DISPOSITION Admitted 01/21/2022 08:34:00 PM      PATIENT REFERREDTO:  No follow-up provider specified.     DISCHARGE MEDICATIONS:  Current Discharge Medication List          DISCONTINUED MEDICATIONS:  Current Discharge Medication List      STOP taking these medications       insulin NPH (HUMULIN N) 100 UNIT/ML injection vial Comments:   Reason for Stopping:                      (Please note that portions ofthis note were completed with a voice recognition program.  Efforts were made to edit the dictations but occasionally words are mis-transcribed.)    BREE Crnae CNP (electronically signed)            BREE Crane CNP  01/22/22 0016

## 2022-01-22 ENCOUNTER — APPOINTMENT (OUTPATIENT)
Dept: GENERAL RADIOLOGY | Age: 27
DRG: 694 | End: 2022-01-22
Payer: COMMERCIAL

## 2022-01-22 ENCOUNTER — ANESTHESIA EVENT (OUTPATIENT)
Dept: OPERATING ROOM | Age: 27
DRG: 694 | End: 2022-01-22
Payer: COMMERCIAL

## 2022-01-22 ENCOUNTER — ANESTHESIA (OUTPATIENT)
Dept: OPERATING ROOM | Age: 27
DRG: 694 | End: 2022-01-22
Payer: COMMERCIAL

## 2022-01-22 VITALS
DIASTOLIC BLOOD PRESSURE: 54 MMHG | WEIGHT: 182.8 LBS | SYSTOLIC BLOOD PRESSURE: 100 MMHG | BODY MASS INDEX: 31.21 KG/M2 | OXYGEN SATURATION: 97 % | TEMPERATURE: 98 F | RESPIRATION RATE: 16 BRPM | HEART RATE: 66 BPM | HEIGHT: 64 IN

## 2022-01-22 VITALS — SYSTOLIC BLOOD PRESSURE: 96 MMHG | DIASTOLIC BLOOD PRESSURE: 65 MMHG | OXYGEN SATURATION: 100 %

## 2022-01-22 LAB
ANION GAP SERPL CALCULATED.3IONS-SCNC: 9 MMOL/L (ref 3–16)
BASOPHILS ABSOLUTE: 0.1 K/UL (ref 0–0.2)
BASOPHILS RELATIVE PERCENT: 0.4 %
BUN BLDV-MCNC: 13 MG/DL (ref 7–20)
CALCIUM SERPL-MCNC: 8.8 MG/DL (ref 8.3–10.6)
CHLORIDE BLD-SCNC: 108 MMOL/L (ref 99–110)
CO2: 22 MMOL/L (ref 21–32)
CREAT SERPL-MCNC: 0.6 MG/DL (ref 0.6–1.1)
EOSINOPHILS ABSOLUTE: 0.2 K/UL (ref 0–0.6)
EOSINOPHILS RELATIVE PERCENT: 1.5 %
GFR AFRICAN AMERICAN: >60
GFR NON-AFRICAN AMERICAN: >60
GLUCOSE BLD-MCNC: 86 MG/DL (ref 70–99)
GONADOTROPIN, CHORIONIC (HCG) QUANT: <5 MIU/ML
HCT VFR BLD CALC: 40.1 % (ref 36–48)
HEMOGLOBIN: 13.5 G/DL (ref 12–16)
LYMPHOCYTES ABSOLUTE: 4.5 K/UL (ref 1–5.1)
LYMPHOCYTES RELATIVE PERCENT: 26.9 %
MCH RBC QN AUTO: 30.5 PG (ref 26–34)
MCHC RBC AUTO-ENTMCNC: 33.5 G/DL (ref 31–36)
MCV RBC AUTO: 90.9 FL (ref 80–100)
MONOCYTES ABSOLUTE: 1.3 K/UL (ref 0–1.3)
MONOCYTES RELATIVE PERCENT: 7.6 %
NEUTROPHILS ABSOLUTE: 10.6 K/UL (ref 1.7–7.7)
NEUTROPHILS RELATIVE PERCENT: 63.6 %
PDW BLD-RTO: 12.7 % (ref 12.4–15.4)
PLATELET # BLD: 266 K/UL (ref 135–450)
PMV BLD AUTO: 7 FL (ref 5–10.5)
POTASSIUM REFLEX MAGNESIUM: 3.7 MMOL/L (ref 3.5–5.1)
RBC # BLD: 4.41 M/UL (ref 4–5.2)
SODIUM BLD-SCNC: 139 MMOL/L (ref 136–145)
WBC # BLD: 16.6 K/UL (ref 4–11)

## 2022-01-22 PROCEDURE — 7100000001 HC PACU RECOVERY - ADDTL 15 MIN: Performed by: UROLOGY

## 2022-01-22 PROCEDURE — 36415 COLL VENOUS BLD VENIPUNCTURE: CPT

## 2022-01-22 PROCEDURE — 84702 CHORIONIC GONADOTROPIN TEST: CPT

## 2022-01-22 PROCEDURE — 0TC78ZZ EXTIRPATION OF MATTER FROM LEFT URETER, VIA NATURAL OR ARTIFICIAL OPENING ENDOSCOPIC: ICD-10-PCS | Performed by: UROLOGY

## 2022-01-22 PROCEDURE — 3600000004 HC SURGERY LEVEL 4 BASE: Performed by: UROLOGY

## 2022-01-22 PROCEDURE — C1769 GUIDE WIRE: HCPCS | Performed by: UROLOGY

## 2022-01-22 PROCEDURE — 2720000010 HC SURG SUPPLY STERILE: Performed by: UROLOGY

## 2022-01-22 PROCEDURE — 3700000001 HC ADD 15 MINUTES (ANESTHESIA): Performed by: UROLOGY

## 2022-01-22 PROCEDURE — 2709999900 HC NON-CHARGEABLE SUPPLY: Performed by: UROLOGY

## 2022-01-22 PROCEDURE — 1200000000 HC SEMI PRIVATE

## 2022-01-22 PROCEDURE — 2580000003 HC RX 258: Performed by: ANESTHESIOLOGY

## 2022-01-22 PROCEDURE — 3700000000 HC ANESTHESIA ATTENDED CARE: Performed by: UROLOGY

## 2022-01-22 PROCEDURE — G0378 HOSPITAL OBSERVATION PER HR: HCPCS

## 2022-01-22 PROCEDURE — 6370000000 HC RX 637 (ALT 250 FOR IP): Performed by: UROLOGY

## 2022-01-22 PROCEDURE — 2500000003 HC RX 250 WO HCPCS: Performed by: ANESTHESIOLOGY

## 2022-01-22 PROCEDURE — 3600000014 HC SURGERY LEVEL 4 ADDTL 15MIN: Performed by: UROLOGY

## 2022-01-22 PROCEDURE — 76000 FLUOROSCOPY <1 HR PHYS/QHP: CPT

## 2022-01-22 PROCEDURE — 7100000000 HC PACU RECOVERY - FIRST 15 MIN: Performed by: UROLOGY

## 2022-01-22 PROCEDURE — 88300 SURGICAL PATH GROSS: CPT

## 2022-01-22 PROCEDURE — 2580000003 HC RX 258: Performed by: UROLOGY

## 2022-01-22 PROCEDURE — 85025 COMPLETE CBC W/AUTO DIFF WBC: CPT

## 2022-01-22 PROCEDURE — 6360000002 HC RX W HCPCS: Performed by: ANESTHESIOLOGY

## 2022-01-22 PROCEDURE — 80048 BASIC METABOLIC PNL TOTAL CA: CPT

## 2022-01-22 PROCEDURE — 82365 CALCULUS SPECTROSCOPY: CPT

## 2022-01-22 RX ORDER — MEPERIDINE HYDROCHLORIDE 25 MG/ML
12.5 INJECTION INTRAMUSCULAR; INTRAVENOUS; SUBCUTANEOUS EVERY 5 MIN PRN
Status: DISCONTINUED | OUTPATIENT
Start: 2022-01-22 | End: 2022-01-22 | Stop reason: HOSPADM

## 2022-01-22 RX ORDER — DEXAMETHASONE SODIUM PHOSPHATE 4 MG/ML
INJECTION, SOLUTION INTRA-ARTICULAR; INTRALESIONAL; INTRAMUSCULAR; INTRAVENOUS; SOFT TISSUE PRN
Status: DISCONTINUED | OUTPATIENT
Start: 2022-01-22 | End: 2022-01-22 | Stop reason: SDUPTHER

## 2022-01-22 RX ORDER — PROPOFOL 10 MG/ML
INJECTION, EMULSION INTRAVENOUS PRN
Status: DISCONTINUED | OUTPATIENT
Start: 2022-01-22 | End: 2022-01-22 | Stop reason: SDUPTHER

## 2022-01-22 RX ORDER — OXYCODONE HYDROCHLORIDE AND ACETAMINOPHEN 5; 325 MG/1; MG/1
0.5 TABLET ORAL EVERY 4 HOURS PRN
Qty: 10 TABLET | Refills: 0 | Status: SHIPPED | OUTPATIENT
Start: 2022-01-22 | End: 2022-01-27

## 2022-01-22 RX ORDER — LIDOCAINE HYDROCHLORIDE 20 MG/ML
JELLY TOPICAL PRN
Status: DISCONTINUED | OUTPATIENT
Start: 2022-01-22 | End: 2022-01-22 | Stop reason: ALTCHOICE

## 2022-01-22 RX ORDER — CEPHALEXIN 250 MG/1
250 CAPSULE ORAL 2 TIMES DAILY
Qty: 6 CAPSULE | Refills: 0 | Status: SHIPPED | OUTPATIENT
Start: 2022-01-22 | End: 2022-01-25

## 2022-01-22 RX ORDER — LIDOCAINE HYDROCHLORIDE 20 MG/ML
INJECTION, SOLUTION EPIDURAL; INFILTRATION; INTRACAUDAL; PERINEURAL PRN
Status: DISCONTINUED | OUTPATIENT
Start: 2022-01-22 | End: 2022-01-22 | Stop reason: SDUPTHER

## 2022-01-22 RX ORDER — FENTANYL CITRATE 50 UG/ML
INJECTION, SOLUTION INTRAMUSCULAR; INTRAVENOUS PRN
Status: DISCONTINUED | OUTPATIENT
Start: 2022-01-22 | End: 2022-01-22 | Stop reason: SDUPTHER

## 2022-01-22 RX ORDER — ONDANSETRON 2 MG/ML
INJECTION INTRAMUSCULAR; INTRAVENOUS PRN
Status: DISCONTINUED | OUTPATIENT
Start: 2022-01-22 | End: 2022-01-22 | Stop reason: SDUPTHER

## 2022-01-22 RX ORDER — OXYCODONE HYDROCHLORIDE AND ACETAMINOPHEN 5; 325 MG/1; MG/1
1 TABLET ORAL PRN
Status: DISCONTINUED | OUTPATIENT
Start: 2022-01-22 | End: 2022-01-22 | Stop reason: HOSPADM

## 2022-01-22 RX ORDER — MAGNESIUM HYDROXIDE 1200 MG/15ML
LIQUID ORAL PRN
Status: DISCONTINUED | OUTPATIENT
Start: 2022-01-22 | End: 2022-01-22 | Stop reason: ALTCHOICE

## 2022-01-22 RX ORDER — ONDANSETRON 2 MG/ML
4 INJECTION INTRAMUSCULAR; INTRAVENOUS
Status: DISCONTINUED | OUTPATIENT
Start: 2022-01-22 | End: 2022-01-22 | Stop reason: HOSPADM

## 2022-01-22 RX ORDER — HYDRALAZINE HYDROCHLORIDE 20 MG/ML
5 INJECTION INTRAMUSCULAR; INTRAVENOUS EVERY 10 MIN PRN
Status: DISCONTINUED | OUTPATIENT
Start: 2022-01-22 | End: 2022-01-22 | Stop reason: HOSPADM

## 2022-01-22 RX ORDER — FENTANYL CITRATE 50 UG/ML
25 INJECTION, SOLUTION INTRAMUSCULAR; INTRAVENOUS EVERY 5 MIN PRN
Status: DISCONTINUED | OUTPATIENT
Start: 2022-01-22 | End: 2022-01-22 | Stop reason: HOSPADM

## 2022-01-22 RX ORDER — SODIUM CHLORIDE, SODIUM LACTATE, POTASSIUM CHLORIDE, CALCIUM CHLORIDE 600; 310; 30; 20 MG/100ML; MG/100ML; MG/100ML; MG/100ML
INJECTION, SOLUTION INTRAVENOUS CONTINUOUS PRN
Status: DISCONTINUED | OUTPATIENT
Start: 2022-01-22 | End: 2022-01-22 | Stop reason: SDUPTHER

## 2022-01-22 RX ORDER — PROMETHAZINE HYDROCHLORIDE 25 MG/ML
6.25 INJECTION, SOLUTION INTRAMUSCULAR; INTRAVENOUS
Status: DISCONTINUED | OUTPATIENT
Start: 2022-01-22 | End: 2022-01-22 | Stop reason: HOSPADM

## 2022-01-22 RX ORDER — OXYCODONE HYDROCHLORIDE AND ACETAMINOPHEN 5; 325 MG/1; MG/1
2 TABLET ORAL PRN
Status: DISCONTINUED | OUTPATIENT
Start: 2022-01-22 | End: 2022-01-22 | Stop reason: HOSPADM

## 2022-01-22 RX ADMIN — SODIUM CHLORIDE, POTASSIUM CHLORIDE, SODIUM LACTATE AND CALCIUM CHLORIDE: 600; 310; 30; 20 INJECTION, SOLUTION INTRAVENOUS at 10:00

## 2022-01-22 RX ADMIN — DEXAMETHASONE SODIUM PHOSPHATE 8 MG: 4 INJECTION, SOLUTION INTRAMUSCULAR; INTRAVENOUS at 10:25

## 2022-01-22 RX ADMIN — FENTANYL CITRATE 100 MCG: 50 INJECTION INTRAMUSCULAR; INTRAVENOUS at 10:17

## 2022-01-22 RX ADMIN — PROPOFOL 100 MG: 10 INJECTION, EMULSION INTRAVENOUS at 10:18

## 2022-01-22 RX ADMIN — LIDOCAINE HYDROCHLORIDE 100 MG: 20 INJECTION, SOLUTION EPIDURAL; INFILTRATION; INTRACAUDAL; PERINEURAL at 10:18

## 2022-01-22 RX ADMIN — ONDANSETRON HYDROCHLORIDE 4 MG: 2 INJECTION, SOLUTION INTRAMUSCULAR; INTRAVENOUS at 10:17

## 2022-01-22 ASSESSMENT — PULMONARY FUNCTION TESTS
PIF_VALUE: 16
PIF_VALUE: 15
PIF_VALUE: 15
PIF_VALUE: 2
PIF_VALUE: 3
PIF_VALUE: 0
PIF_VALUE: 0
PIF_VALUE: 3
PIF_VALUE: 16
PIF_VALUE: 1
PIF_VALUE: 3
PIF_VALUE: 16
PIF_VALUE: 15
PIF_VALUE: 16
PIF_VALUE: 1
PIF_VALUE: 1
PIF_VALUE: 3
PIF_VALUE: 11
PIF_VALUE: 16
PIF_VALUE: 2
PIF_VALUE: 16
PIF_VALUE: 16
PIF_VALUE: 4
PIF_VALUE: 15
PIF_VALUE: 16
PIF_VALUE: 26

## 2022-01-22 ASSESSMENT — PAIN - FUNCTIONAL ASSESSMENT
PAIN_FUNCTIONAL_ASSESSMENT: 0-10

## 2022-01-22 ASSESSMENT — LIFESTYLE VARIABLES: SMOKING_STATUS: 0

## 2022-01-22 ASSESSMENT — PAIN SCALES - GENERAL
PAINLEVEL_OUTOF10: 0
PAINLEVEL_OUTOF10: 0

## 2022-01-22 NOTE — BRIEF OP NOTE
Brief Postoperative Note      Patient: Attila Hall  YOB: 1995  MRN: [id number]    Date of Procedure: 1/22/2022    Pre-Op Diagnosis: difficulty urinating, left ureteral stone    Post-Op Diagnosis: Same       Procedure(s):  CYSTOSCOPY, STONE REMOVAL    Surgeon(s):  Britany Boudreaux MD    Assistant:  * No surgical staff found *    Anesthesia: General    Estimated Blood Loss (mL): Minimal    Complications: None    Specimens:   ID Type Source Tests Collected by Time Destination   A : LEFT URETERAL STONE Tissue Tissue SURGICAL PATHOLOGY Britany Boudreaux MD 1/22/2022 1032        Implants:  * No implants in log *      Drains: * No LDAs found *    Findings: Left ureteral stone, removed. PLAN:  Probable discharge today.     Electronically signed by Don Morillo MD on 1/22/2022 at 10:44 AM

## 2022-01-22 NOTE — PROGRESS NOTES
Received report from Dr Cassy Ware and Linda Mariano, RN. VSS with Sp02 97 on r/a. Pt awake and denies pain/nausea. Will continue to monitor.

## 2022-01-22 NOTE — PROGRESS NOTES
Patient admitted to room _235___ from ER. Patient oriented to room, call light, bed rails, phone, lights and bathroom. Patient instructed about the schedule of the day including: vital sign frequency, lab draws, possible tests, frequency of MD and staff rounds, daily weights, I &O's and prescribed diet. Bed alarm deferred patient low fall risk and refuses alarm. Bed locked, in lowest position, side rails up 2/4, call light within reach. Recliner Assessment:     Patient is able to demonstrate the ability to move from a reclining position to an upright position within the recliner. 4 Eyes Skin Assessment     The patient is being assess for   Admission    I agree that 2 RN's have performed a thorough Head to Toe Skin Assessment on the patient. ALL assessment sites listed below have been assessed. Areas assessed for pressure by both nurses:   [x]   Head, Face, and Ears   [x]   Shoulders, Back, and Chest, Abdomen  [x]   Arms, Elbows, and Hands   [x]   Coccyx, Sacrum, and Ischium  [x]   Legs, Feet, and Heels        Skin Assessed Under all Medical Devices by both nurses:  n/a              All Mepilex Borders were peeled back and area peeked at by both nurses:  No: n/a  Please list where Mepilex Borders are located:  N/A             **SHARE this note so that the co-signing nurse is able to place an eSignature**     Co-signer eSignature: Electronically signed by Jerel Caba RN on 1/22/22 at 4:56 AM EST    Does the Patient have Skin Breakdown related to pressure?   No     (Insert Photo here n/a)         Byron Prevention initiated:  NA   Wound Care Orders initiated:  NA      [facility] nurse consulted for Pressure Injury (Stage 3,4, Unstageable, DTI, NWPT, Complex wounds)and New or Established Ostomies:  NA      Primary Nurse eSignature: Electronically signed by Elin Cobb RN on 1/22/22 at 1:57 AM EST

## 2022-01-22 NOTE — PROGRESS NOTES
Patient returned to room, spouse at bedside. Denies any complains of, VSS. Ambulated to bathroom, gait steady voiding yellow clear urine. Will continue to monitor.

## 2022-01-22 NOTE — FLOWSHEET NOTE
01/22/22 0745   Vital Signs   Temp 97.8 °F (36.6 °C)   Temp Source Oral   Pulse 77   Heart Rate Source Monitor   Resp 18   /65   BP Location Left upper arm   Patient Position Sitting   Level of Consciousness Alert (0)   MEWS Score 1   Patient Currently in Pain Denies   Oxygen Therapy   SpO2 97 %   O2 Device None (Room air)     Patient sitting up in bed, denies any complains of. Continent of clear yellow urine, with no pain or discomfort upon urination. Urine is being strained. Patient is NPO. Patient is breast feeding and does understand she needs to pump and dump if taking narcotics. Bed in low position. Call bell within reach. Bedside table within reach. Will continue to monitor.

## 2022-01-22 NOTE — PROGRESS NOTES
Pt admitted to rm 235 from ER, pt alert and oriented x4, completed admission and started pt on NS at 125/hr  per orders, no c/o pain or nausea at this time,  Vss, gave pt snack and instructed pt  she would be npo after midnight,  oriented to room, call light and orders.  Will continue to monitor

## 2022-01-22 NOTE — DISCHARGE SUMMARY
Physician Discharge Summary        Patient ID:  Stiven Mahoney  [id number]  28 y.o.  1995    Admit date: 1/21/2022    Discharge date and time: 1/22/2022  1:38 PM     Admitting Physician: Jeanine Mae MD     Discharge Physician: Sae Colin MD    Admission Diagnoses: Ureterolithiasis [N20.1]  Hydronephrosis with urinary obstruction due to renal calculus [N13.2]    Discharge Diagnoses: Ureterolithiasis [N20.1]  Hydronephrosis with urinary obstruction due to renal calculus [N13.2]    Discharged Condition: good      Hospital Course: Patient admitted for renal colic and a kidney stone. Stone management protocal was used and the patient is recovering. Management included surgical removal conservative management. Patient with successful voiding trial prior to discharge. Instructions for post op care and follow up were reviewed. Physical exam at the time of discharge was unremarkable, no post operative complications noted. Discharge Exam:   Physical exam at the time of discharge was unremarkable, no post operative complications noted. Disposition: home    Patient Instructions:      Medication List      START taking these medications    cephALEXin 250 MG capsule  Commonly known as: Keflex  Take 1 capsule by mouth 2 times daily for 3 days     oxyCODONE-acetaminophen 5-325 MG per tablet  Commonly known as: Percocet  Take 0.5 tablets by mouth every 4 hours as needed for Pain for up to 5 days. Notes to patient: Oxycontin(Oxycodone)  Use:  pain    Side effects: sleepiness, constipation        CONTINUE taking these medications    PRENATAL 1 PO           Where to Get Your Medications      You can get these medications from any pharmacy    Bring a paper prescription for each of these medications  · cephALEXin 250 MG capsule  · oxyCODONE-acetaminophen 5-325 MG per tablet       Activity: no lifting, or Strenuous exercise for 1 week.   Diet: regular diet    Follow-up with Dr. Nelson Yang in 3-4 weeks.     Signed:  Claudia Abrams MD  1/22/2022  1:49 PM

## 2022-01-22 NOTE — ANESTHESIA PRE PROCEDURE
Department of Anesthesiology  Preprocedure Note       Name:  Yaz Joiner   Age:  32 y.o.  :  1995                                          MRN:  [id number]         Date:  2022      Surgeon: Estefany Ng MD    Procedure:    CYSTOSCOPY URETERAL STENT INSERTION    HPI:   32 y.o. female with PMH below, presents with L abd/flank pain, N/V. Pt reports that she has had severe L abd radiating to L flank pain today. She said she had similar pain last week but it resolved. It has been fairly constant since onset this am.  Waxing/waning, severe, sharp at times. She was found to have a 7 mm L ureteral stone on CT. She has had associated N/V. Of note, she is 8 wks post partum. She is breast feeding. CT ABD/Pelvis:  7 mm stone lodged in the distal left ureter at the level just above the bladder wall which is causing mild to moderate obstruction. Tiny 1 mm stone lower pole left kidney with no renal masses. Unremarkable uterus and ovaries with minimal free fluid in the cul-de-sac which is probably physiologic.  There is no adnexal mass or active inflammation in the pelvis     Medications prior to admission:    Prenatal MV-Min-Fe Fum-FA-DHA (PRENATAL 1 PO) Take by mouth     Current medications:     enoxaparin (LOVENOX) injection 40 mg  40 mg SubCUTAneous Daily    ondansetron (ZOFRAN-ODT) disintegrating tablet 4 mg  4 mg Oral Q8H PRN       ondansetron (ZOFRAN) injection 4 mg  4 mg IntraVENous Q6H PRN    polyethylene glycol (GLYCOLAX) packet 17 g  17 g Oral Daily PRN    acetaminophen (TYLENOL) tablet 650 mg  650 mg Oral Q6H PRN       acetaminophen (TYLENOL) suppository 650 mg  650 mg Rectal Q6H PRN    potassium chloride 10 mEq/100 mL IVPB (Peripheral Line)  10 mEq IntraVENous PRN    magnesium sulfate 2000 mg in 50 mL IVPB premix  2,000 mg IntraVENous PRN    prochlorperazine (COMPAZINE) injection 10 mg  10 mg IntraVENous Q6H PRN    HYDROmorphone (DILAUDID) injection 0.25 mg  0.25 mg IntraVENous Q3H PRN       HYDROmorphone (DILAUDID) injection 0.5 mg  0.5 mg IntraVENous Q3H PRN     Allergies:  No Known Allergies    Problem List:     Insulin controlled gestational diabetes mellitus (GDM) during pregnancy, antepartum    Hydronephrosis with urinary obstruction due to renal calculus     Past Medical History:  History reviewed. No pertinent past medical history. Past Surgical History:  History reviewed. No pertinent surgical history. Social History:     Smoking status: Never Smoker    Smokeless tobacco: Never Used   Substance Use Topics    Alcohol use: Yes     Comment: special occasions     Vital Signs (Current):    01/21/22 2142 01/21/22 2227 01/22/22 0356 01/22/22 0745   BP: (!) 116/45 108/63 (!) 111/58 110/65   Pulse: 84 78 64 77   Resp: 12 16 16 18   Temp:  96.4 °F (35.8 °C) 96.8 °F (36 °C) 97.8 °F (36.6 °C)   TempSrc:  Oral Oral Oral   SpO2: 96% 97% 97% 97%   Weight:  182 lb 12.8 oz (82.9 kg)     Height:  5' 4\" (1.626 m)            BP Readings from Last 3 Encounters:   01/22/22 110/65   10/15/21 105/60   08/14/20 104/78     NPO Status: > 8 hrs w/o N/V                       BMI:   Wt Readings from Last 3 Encounters:   01/21/22 182 lb 12.8 oz (82.9 kg)   10/15/21 197 lb (89.4 kg)   08/14/20 164 lb (74.4 kg)     Body mass index is 31.38 kg/m². CBC:     WBC 16.6 01/22/2022    HGB 13.5 01/22/2022    HCT 40.1 01/22/2022     01/22/2022     CMP:     01/22/2022    K 3.7 01/22/2022     01/22/2022    CO2 22 01/22/2022    BUN 13 01/22/2022    CREATININE 0.6 01/22/2022    GLUCOSE 86 01/22/2022    PROT 7.0 01/21/2022    CALCIUM 8.8 01/22/2022    BILITOT 0.4 01/21/2022    ALKPHOS 64 01/21/2022    AST 23 01/21/2022    ALT 40 92/06/7827     HCG (If Applicable): Serum HCG negative    COVID-19 Screening (If Applicable):     COVID19 NOT DETECTED 01/21/2022     Anesthesia Evaluation  Patient summary reviewed and Nursing notes reviewed no history of anesthetic complications: Airway: Mallampati: I  TM distance: >3 FB   Neck ROM: full  Mouth opening: > = 3 FB Dental:          Pulmonary: breath sounds clear to auscultation      (-) asthma, recent URI, sleep apnea and not a current smoker                           Cardiovascular:  Exercise tolerance: good (>4 METS),       (-) dysrhythmias and  HUNTER      Rhythm: regular  Rate: normal                    Neuro/Psych:      (-) seizures and neuromuscular disease           GI/Hepatic/Renal:   (+) renal disease: kidney stones,      (-) GERD, hepatitis and liver disease       Endo/Other:    (+) Diabetes, .    (-) hypothyroidism                ROS comment: +Gestational DM- no current insulin use Abdominal:             Vascular:     - DVT and PE. Other Findings:           Anesthesia Plan      general     ASA 2 - emergent       Induction: intravenous. MIPS: Prophylactic antiemetics administered. Anesthetic plan and risks discussed with patient.                 Sujey Belcher MD

## 2022-01-22 NOTE — ANESTHESIA POSTPROCEDURE EVALUATION
Department of Anesthesiology  Postprocedure Note    Patient: Jyoti Rosas  MRN: [id number]  YOB: 1995  Date of evaluation: 1/22/2022    Procedure Summary     Date: 01/22/22 Room / Location: [place] [address] / [facility]    Anesthesia Start: 2101 Anesthesia Stop: 9081    Procedure: CYSTOSCOPY, STONE REMOVAL (N/A Bladder) Diagnosis: (difficulty urinating)    Surgeons: Marina Dunlap MD Responsible Provider: Eduarda Maxwell MD    Anesthesia Type: general ASA Status: 2 - Emergent          Anesthesia Type: general    Gilmar Phase I: Gilmar Score: 10    Gilmar Phase II:      Last vitals: Reviewed and per EMR flowsheets.        Anesthesia Post Evaluation   Anesthetic Problems: no   Cardiovascular System Stable: yes  Respiratory Function: Airway Patent yes  ETT no  Ventilator no  Level of consciousness: awake, alert and oriented  Post-op pain: adequate analgesia  Hydration Adequate: yes  Nausea/Vomiting:no  Other Issues:     Eun Li MD

## 2022-01-22 NOTE — PROGRESS NOTES
Patient given discharge instructions both verbally and written along with follow up two paper prescriptions and follow up appointments. Patient expressed full understanding.

## 2022-01-22 NOTE — CONSULTS
1/22/2022  Lynda Jewell    Reason for Consult:  Left flank pain  Requesting Physician:  Vilma Felton      History Obtained From:  patient, electronic medical record    HISTORY OF PRESENT ILLNESS:                The patient is a 32 y.o. female who presents with left flank pain. Her CT showed a 7 mm distal stone. Past Medical History:    History reviewed. No pertinent past medical history. Past Surgical History:    History reviewed. No pertinent surgical history.   Current Medications:   Current Facility-Administered Medications: meperidine (DEMEROL) injection 12.5 mg, 12.5 mg, IntraVENous, Q5 Min PRN  fentaNYL (SUBLIMAZE) injection 25 mcg, 25 mcg, IntraVENous, Q5 Min PRN  HYDROmorphone (DILAUDID) injection 0.5 mg, 0.5 mg, IntraVENous, Q5 Min PRN  HYDROmorphone (DILAUDID) injection 0.25 mg, 0.25 mg, IntraVENous, Q5 Min PRN  HYDROmorphone (DILAUDID) injection 0.5 mg, 0.5 mg, IntraVENous, Q5 Min PRN  oxyCODONE-acetaminophen (PERCOCET) 5-325 MG per tablet 1 tablet, 1 tablet, Oral, PRN **OR** oxyCODONE-acetaminophen (PERCOCET) 5-325 MG per tablet 2 tablet, 2 tablet, Oral, PRN  ondansetron (ZOFRAN) injection 4 mg, 4 mg, IntraVENous, Once PRN  promethazine (PHENERGAN) injection 6.25 mg, 6.25 mg, IntraMUSCular, Once PRN  hydrALAZINE (APRESOLINE) injection 5 mg, 5 mg, IntraVENous, Q10 Min PRN  lidocaine (XYLOCAINE) 2 % uro-jet, , , PRN  sterile water for irrigation, , , PRN  0.9 % sodium chloride infusion, , IntraVENous, Continuous  sodium chloride flush 0.9 % injection 5-40 mL, 5-40 mL, IntraVENous, 2 times per day  sodium chloride flush 0.9 % injection 5-40 mL, 5-40 mL, IntraVENous, PRN  0.9 % sodium chloride infusion, 25 mL, IntraVENous, PRN  enoxaparin (LOVENOX) injection 40 mg, 40 mg, SubCUTAneous, Daily  ondansetron (ZOFRAN-ODT) disintegrating tablet 4 mg, 4 mg, Oral, Q8H PRN **OR** ondansetron (ZOFRAN) injection 4 mg, 4 mg, IntraVENous, Q6H PRN  polyethylene glycol (GLYCOLAX) packet 17 g, 17 g, Oral, Daily PRN  acetaminophen (TYLENOL) tablet 650 mg, 650 mg, Oral, Q6H PRN **OR** acetaminophen (TYLENOL) suppository 650 mg, 650 mg, Rectal, Q6H PRN  potassium chloride 10 mEq/100 mL IVPB (Peripheral Line), 10 mEq, IntraVENous, PRN  magnesium sulfate 2000 mg in 50 mL IVPB premix, 2,000 mg, IntraVENous, PRN  prochlorperazine (COMPAZINE) injection 10 mg, 10 mg, IntraVENous, Q6H PRN  HYDROmorphone (DILAUDID) injection 0.25 mg, 0.25 mg, IntraVENous, Q3H PRN **OR** HYDROmorphone (DILAUDID) injection 0.5 mg, 0.5 mg, IntraVENous, Q3H PRN  Facility-Administered Medications Ordered in Other Encounters: lactated ringers infusion, , IntraVENous, Continuous PRN  fentaNYL (SUBLIMAZE) injection, , IntraVENous, PRN  ondansetron (ZOFRAN) injection, , IntraVENous, PRN  propofol injection, , IntraVENous, PRN  lidocaine PF 2 % injection, , IntraVENous, PRN  Dexamethasone Sodium Phosphate injection, , IntraVENous, PRN  Allergies:  No Known Allergies  Social History:  Reviewed, non contributory    Family History:  Reviewed, non contributory      REVIEW OF SYSTEMS:    12 point ROS has been completed and reviewed. She is post partum 8 weeks. PHYSICAL EXAM:    VITALS:  /65   Pulse 77   Temp 97.8 °F (36.6 °C) (Oral)   Resp 18   Ht 5' 4\" (1.626 m)   Wt 182 lb 12.8 oz (82.9 kg)   LMP  (LMP Unknown)   SpO2 97%   Breastfeeding Yes   BMI 31.38 kg/m²   H&N: Sclera normal, no masses, trachea midline, no bruit  CVS: Normal rate and rhythm, no murmurs or rubs, peripheral pulses equal, no clubbing or cyanosis. RESP: Breath sounds equal bilateral, few rhonchi. ABDO: Soft, non-tender, bowel sounds active, no organomegaly, no hernias. LYMPH:  No lymphadenopathy. Skin: Warm dry and intact. : No CVAT, normal external genitalia, no discharge. MSK: Grossly normal for patient  WELLINGTON: Grossly normal for patient  PSY: No acute changes noted in psychosocial assessment.     DATA: CBC:   Lab Results   Component Value Date    WBC 16.6 01/22/2022    RBC 4.41 01/22/2022    HGB 13.5 01/22/2022    HCT 40.1 01/22/2022    MCV 90.9 01/22/2022    MCH 30.5 01/22/2022    MCHC 33.5 01/22/2022    RDW 12.7 01/22/2022     01/22/2022    MPV 7.0 01/22/2022     BMP:    Lab Results   Component Value Date     01/22/2022    K 3.7 01/22/2022     01/22/2022    CO2 22 01/22/2022    BUN 13 01/22/2022    LABALBU 4.4 01/21/2022    CREATININE 0.6 01/22/2022    CALCIUM 8.8 01/22/2022    GFRAA >60 01/22/2022    LABGLOM >60 01/22/2022    GLUCOSE 86 01/22/2022     U/A:    Lab Results   Component Value Date    COLORU Yellow 01/21/2022    PROTEINU Negative 01/21/2022    PHUR 6.0 01/21/2022    WBCUA 0-2 01/21/2022    RBCUA 3-4 01/21/2022    BACTERIA 1+ 08/03/2014    CLARITYU Clear 01/21/2022    SPECGRAV >=1.030 01/21/2022    LEUKOCYTESUR Negative 01/21/2022    UROBILINOGEN 0.2 01/21/2022    BILIRUBINUR Negative 01/21/2022    BLOODU TRACE-INTACT 01/21/2022    GLUCOSEU Negative 01/21/2022     CT reviewed    IMPRESSION/RECOMMENDATIONS:      Left renal colic and stone. She has failed medical management to date and will be taken to the OR for a stone extractions. Thank you for asking me to see this interesting patient.     Timothy Baumann MD

## 2022-01-22 NOTE — ED PROVIDER NOTES
I independently examined and evaluated Lynda Jewell. I personally saw the patient and performed a substantive portion of the visit including all aspects of the medical decision making. In brief, this 59-year-old female who is 8 weeks postpartum is presenting with 1 day of severe left-sided flank pain. She has had nausea with vomiting associate with this pain. No fevers, no chills. No dysuria or hematuria. No vaginal bleeding or discharge. Focused exam revealed   General: Alert, no acute distress, patient resting comfortably   Skin: warm, intact, no pallor noted   Head: Normocephalic, atraumatic   Eye: Normal conjunctiva   Cardiac: Normal peripheral perfusion  Respiratory: No acute distress   Musculoskeletal: No deformity, full ROM. Back: Left-sided CVA tenderness  Neurological: alert and oriented, normal sensory and motor observed. Psychiatric: Cooperative    ED course: Presented with left flank pain. Treated with Zofran and Toradol with improvement. CT does show 7 mm stone with hydro nephrosis. No signs of infection. Discussed with urology who plans for lithotripsy in the morning. Patient mid to the hospitalist service for further treatment and evaluation. All diagnostic, treatment, and disposition decisions were made by myself in conjunction with the advanced practice provider. For all further details of the patient's emergency department visit, please see the advanced practice provider's documentation. Comment: Please note this report has been produced using speech recognition software and may contain errors related to that system including errors in grammar, punctuation, and spelling, as well as words and phrases that may be inappropriate. If there are any questions or concerns please feel free to contact the dictating provider for clarification.        Christos Munoz DO  01/22/22 0421

## 2022-01-22 NOTE — H&P
Hospital Medicine History & Physical      PCP: Alek Sherman MD    Date of Service: Pt seen/examined on 1/21/22 and admitted on 1/21/22 to Inpatient    Chief Complaint   Patient presents with    Flank Pain     pt c/o worsening L side / flank pain, similar episode last wk but went away. this am worse w N/V. denies hx. currently 8 weeks postpartum uncomplicated delivery and successul 6 wk check up       History Of Present Illness: The patient is a 32 y.o. female with PMH below, presents with L abd/flank pain, N/V. Pt reports that she has had severe L abd radiating to L flank pain today. She said she had similar pain last week but it resolved. It has been fairly constant since onset this am.  Waxing/waning, severe, sharp at times. She was found to have a 7 mm L ureteral stone on CT. She has had associated N/V. Of note, she is 8 wks post partum. She is breast feeding. She is aware that she will not be able to feed the baby any of her milk while she is receiving narcotics and possibly other Rx. Past Medical History:    History reviewed. No pertinent past medical history. Past Surgical History:    History reviewed. No pertinent surgical history. Medications Prior to Admission:    Prior to Admission medications    Medication Sig Start Date End Date Taking? Authorizing Provider   Prenatal MV-Min-Fe Fum-FA-DHA (PRENATAL 1 PO) Take by mouth   Yes Historical Provider, MD       Allergies:  Patient has no known allergies. Social History:    TOBACCO:   reports that she has never smoked. She has never used smokeless tobacco.  ETOH:   reports current alcohol use. Family History:  Reviewed in detail and negative for DM, Early CAD, Cancer (except as below).  Positive as follows:        Problem Relation Age of Onset    Anemia Mother     Diabetes Mother     High Blood Pressure Mother     Diabetes Father     High Blood Pressure Father        REVIEW OF SYSTEMS:   Pertinent positives/negatives as follows: L abd/flank pain, N/V, and as discussed in HPI, otherwise a complete ROS performed and all other systems are negative. PHYSICAL EXAM PERFORMED:  /70   Pulse 83   Temp 97.4 °F (36.3 °C)   Resp 18   Wt 180 lb (81.6 kg)   SpO2 96%   BMI 30.90 kg/m²     GEN:  A&Ox3, NAD. HEENT:  NC/AT,EOMI, MMM, no erythema/exudates or visible masses. CVS:  Normal S1,S2. RRR. Without M/G/R.   LUNG:   CTA-B. no wheezes, rales or rhonchi  ABD:  Soft, ND, L sided ttp, BS+ x4. Without G/R.  EXT: 2+ pulses, no c/c/e. Brisk cap refill. PSY:  Thought process intact, affect appropriate. WELLINGTON:  CN III-XII intact, moves all 4 spontaneously, sensory grossly intact. SKIN: No rash or lesions on visible skin. Chart review shows recent radiographs:  CT ABDOMEN PELVIS W IV CONTRAST Additional Contrast? None    Result Date: 1/21/2022  EXAMINATION: CT OF THE ABDOMEN AND PELVIS WITH CONTRAST 1/21/2022 5:34 pm TECHNIQUE: CT of the abdomen and pelvis was performed with the administration of intravenous contrast. Multiplanar reformatted images are provided for review. Dose modulation, iterative reconstruction, and/or weight based adjustment of the mA/kV was utilized to reduce the radiation dose to as low as reasonably achievable. COMPARISON: None. HISTORY: ORDERING SYSTEM PROVIDED HISTORY: Pain TECHNOLOGIST PROVIDED HISTORY: Reason for exam:->Pain Additional Contrast?->None Decision Support Exception - unselect if not a suspected or confirmed emergency medical condition->Emergency Medical Condition (MA) Reason for Exam: 8 weeks status post baby delivery,    lower left abd pain with vomiting  acute onset this am. FINDINGS: Lower Chest:   The lung bases are clear. There is a small hiatal hernia. Organs: The liver is mildly enlarged with hypertrophy of the caudate and left lobes and fatty replacement throughout. The gallbladder, pancreas, and bile ducts are normal.  The spleen is grossly unremarkable. The adrenals are normal.  The left kidney is slightly enlarged with mild stranding around the kidney. There is mild hydronephrosis on the right there is a tiny 1-2 mm stone along the lower pole on the left. The left ureter is mildly dilated down to the bladder and there is a 7 x 6 mm stone lodged in the distal ureter, just proximal to the ureteral vesicle junction. GI/Bowel: The appendix is not well visualized with no pericecal inflammation. There is mild feces scattered in the colon. The small bowel is normal caliber. The mesentery is unremarkable. Pelvis: The bladder is unremarkable. The uterus is grossly unremarkable. There is minimal free fluid in the cul-de-sac with no adnexal mass. Peritoneum/Retroperitoneum:   The aorta is unremarkable with no retroperitoneal aneurysm or dissection and no retroperitoneal mass or adenopathy is seen Bones/Soft Tissues: The bones are intact. No aggressive osseous lesion is seen. 7 mm stone lodged in the distal left ureter at the level just above the bladder wall which is causing mild to moderate obstruction. Tiny 1 mm stone lower pole left kidney with no renal masses. Unremarkable uterus and ovaries with minimal free fluid in the cul-de-sac which is probably physiologic.   There is no adnexal mass or active inflammation in the pelvis     CBC:  Recent Labs     01/21/22  1335   WBC 17.6*   HGB 15.3   HCT 47.0           RENAL  Recent Labs     01/21/22  1610      K 4.1      CO2 25   BUN 13   CREATININE 0.8   GLUCOSE 93     LFT'S:  Recent Labs     01/21/22  1610   AST 23   ALT 40   BILITOT 0.4   ALKPHOS 64     U/A:  Recent Labs     01/21/22  1336   LEUKOCYTESUR Negative   WBCUA 0-2   COLORU Yellow   RBCUA 3-4   CLARITYU Clear   SPECGRAV >=1.030   BLOODU TRACE-INTACT*   GLUCOSEU Negative         PHYSICIAN CERTIFICATION  [facility] certify that 60 [facility] expected to be hospitalized for 2 midnights based on the following assessment and plan:    ASSESSMENT/PLAN:  1. Obstructing renal calculi, 7 mm, L, distal ureter. Admit at Dr. Bee Rosenberg request, consulted, plan for OR tomorrow, NPO aft MN.  IVF, PRN Dilaudid/Zofran. 2. Leukocytosis, 17.6, no clear source of infection. Reactive? REpeat in am.   3. Post partum ~ 8 wks, currently breast feeding. Discussed risk of BF while receiving narcotics and other Rx. She plans to \"pump and dump\" while IP and for several days after DC. Exactly how long after will depend on what she is given and should be discussed w/ her at time of DC. She has frozen milk at home they will use in the interim. 4. COVID FLU PCR neg. DVT Prophylaxis: Lx  Diet: gen, NPO aft MN  Code Status: Full Code   PT/OT Eval Status: Will order if needed and as patient condition allows  Dispo - Admit to inpatient    Alexandra Villarreal MD    Thank you Liliya Marrero MD for the opportunity to be involved in this patient's care. If you have any questions or concerns please feel free to contact me via the [employer] at [phone]. This chart was generated using the [address] [place] [address] [facility] system. I created this record but it may contain dictation errors given the limitations of this technology.

## 2022-01-24 ENCOUNTER — TELEPHONE (OUTPATIENT)
Dept: INTERNAL MEDICINE CLINIC | Age: 27
End: 2022-01-24

## 2022-01-24 NOTE — OP NOTE
[address]                 [address] [facility], [address]                                OPERATIVE REPORT    PATIENT NAME: Jose Antonio Langley              :        1995  MED REC NO:   [id number]                          ROOM:       5114  ACCOUNT NO:   [de-identified]                           ADMIT DATE: 2022  PROVIDER:     Michaela Jeffries MD    DATE OF PROCEDURE:  2022    PREOPERATIVE DIAGNOSIS:  Left ureteral calculus. POSTOPERATIVE DIAGNOSIS:  Left ureteral calculus. OPERATION PERFORMED:  Cystoscopy with left ureteroscopy, holmium laser  lithotripsy, stone extraction. SURGEON:  Michaela Jeffries MD    ANESTHESIA:  General    ESTIMATED BLOOD LOSS:  5 mL    OPERATIVE FINDINGS:  Impacted distal left ureteral calculus. HISTORY AND INDICATIONS:  A 20-year-old white female who presented to  the hospital with acute onset of left renal colic. She was diagnosed on  CT showing a 7-mm distal left ureteral stone. She had been admitted to  the medical service and urologic consultation was obtained. Evaluation  of the patient had shown that she had failed to pass the stone with  medical management. Options were discussed and she elected to proceed  forth with today's procedure on an urgent basis. Risks, benefits and  expected outcomes were discussed. PROCEDURE IN DETAIL:  After obtaining informed consent, the patient was  taken to the operative suite. She was given a general anesthetic as  well as intravenous antibiotics. Once adequately anesthetized, she was  placed on the operative table in a modified dorsal lithotomy position. Prepping and draping was done in sterile fashion. Cystourethroscopy was  then performed. Bladder itself was shown to have prolapse although she  is postpartum eight weeks. No other bladder cancer, tumors or stones  were noted.   Both ureteral orifices were orthotopic with minimal efflux  on the left side.    A wire was then attempted under fluoroscopy to be placed into the left  ureter _____. with resistance presumably from the stone. A single  channel ureteroscope was then advanced atraumatically through the UO and  into the patient's distal ureter. Maury Dunlap was able to be identified. Using a 200-micron fiber, it was lasered and dusted into small  particles. These were subsequently extracted. Followup showed no  significant obstruction or ureteral wall damage and decided at that  point not to place a double-J stent. Cystoscope was then used to  evacuate all the stones which were sent for pathologic analysis. Her  bladder was drained and lidocaine jelly was applied and she was taken  back to the postop recovery room in stable condition.           Miguelito Barillas MD    D: 01/23/2022 20:37:05       T: 01/23/2022 20:39:34     [email or web address]  Job#: [id number]     Doc#: [id number]    CC:

## 2022-01-28 LAB
CALCULI COMPOSITION: NORMAL
MASS: 13 MG
STONE DESCRIPTION: NORMAL

## 2022-08-02 ENCOUNTER — TELEPHONE (OUTPATIENT)
Dept: INTERNAL MEDICINE CLINIC | Age: 27
End: 2022-08-02

## 2022-08-02 DIAGNOSIS — J06.9 UPPER RESPIRATORY TRACT INFECTION, UNSPECIFIED TYPE: Primary | ICD-10-CM

## 2022-08-02 RX ORDER — AZITHROMYCIN 250 MG/1
TABLET, FILM COATED ORAL
Qty: 1 PACKET | Refills: 0 | Status: SHIPPED | OUTPATIENT
Start: 2022-08-02

## 2022-08-02 NOTE — TELEPHONE ENCOUNTER
----- Message from Curry Giraldo MD sent at 8/2/2022  1:04 PM EDT -----  Shanna San for z pack   She was diabetic during her last pregnancy  Ask if she still on insulin  Needs annual exam when due    ----- Message -----  From: Epi Kenny  Sent: 8/2/2022   1:02 PM EDT  To: Curry Giraldo MD    Pt requesting abx for sinus infection, states has been off and on, started again about 3-4 days ago and not getting any better. Tested neg for Covid. Has sinus pressure, cough, headache, congestion, & coughing up green mucus. Tried sudafed & tylenol which has not helped. Please advise.   [facility]

## 2022-11-10 ENCOUNTER — TELEPHONE (OUTPATIENT)
Dept: INTERNAL MEDICINE CLINIC | Age: 27
End: 2022-11-10

## 2022-11-10 RX ORDER — AMOXICILLIN 500 MG/1
500 CAPSULE ORAL 3 TIMES DAILY
Qty: 15 CAPSULE | Refills: 0 | Status: SHIPPED | OUTPATIENT
Start: 2022-11-10 | End: 2022-11-15

## 2022-11-10 NOTE — TELEPHONE ENCOUNTER
----- Message from Tre Guaman MD sent at 11/10/2022  4:28 PM EST -----  Contact: Yazmin Guillory [phone]  If her gynecologist is ok , she can take amox 500 mg tid  x 5 days    ----- Message -----  From: Brijesh Cuevas  Sent: 11/10/2022   4:23 PM EST  To: Tre Guaman MD    Patient is pregnant and has congestion, green mucus, pressure in head, eyes and face. She is Covid negative.      [facility]     Thank you

## 2023-05-25 ENCOUNTER — OFFICE VISIT (OUTPATIENT)
Dept: INTERNAL MEDICINE CLINIC | Age: 28
End: 2023-05-25

## 2023-05-25 VITALS
HEART RATE: 65 BPM | SYSTOLIC BLOOD PRESSURE: 100 MMHG | WEIGHT: 185 LBS | BODY MASS INDEX: 31.58 KG/M2 | DIASTOLIC BLOOD PRESSURE: 65 MMHG | HEIGHT: 64 IN | RESPIRATION RATE: 18 BRPM

## 2023-05-25 DIAGNOSIS — O24.414 INSULIN CONTROLLED GESTATIONAL DIABETES MELLITUS (GDM) DURING PREGNANCY, ANTEPARTUM: ICD-10-CM

## 2023-05-25 DIAGNOSIS — Z00.00 ANNUAL PHYSICAL EXAM: Primary | ICD-10-CM

## 2023-05-25 PROCEDURE — 99395 PREV VISIT EST AGE 18-39: CPT | Performed by: INTERNAL MEDICINE

## 2023-05-25 ASSESSMENT — PATIENT HEALTH QUESTIONNAIRE - PHQ9
SUM OF ALL RESPONSES TO PHQ9 QUESTIONS 1 & 2: 0
SUM OF ALL RESPONSES TO PHQ QUESTIONS 1-9: 0
2. FEELING DOWN, DEPRESSED OR HOPELESS: 0
1. LITTLE INTEREST OR PLEASURE IN DOING THINGS: 0
SUM OF ALL RESPONSES TO PHQ QUESTIONS 1-9: 0

## 2023-05-25 NOTE — PROGRESS NOTES
HPI    32 y.o. female with no medical issues here for annual exam    Since last time, pt had 2nd baby with no issues  Developed gestational DM needing  NPH at 10-16 units nightly   Now off insulin for 6 weeks and doing well   Not checking sugars any more       Pt with no complaints today   Remains on prenatal MVT       never had any surgeries   Employed as a nurse at [address]  Non smoker    Mother and father have DM   No depression issues       No past medical history on file.   Past Surgical History:   Procedure Laterality Date    CYSTOSCOPY Left 1/22/2022    CYSTOSCOPY, LEFT URETEROSCOPY WITH LASER LITHOTRIPSY, LEFT URETERAL STONE REMOVAL performed by Tray Neal MD at [facility] OR     No Known Allergies  Social History     Socioeconomic History    Marital status:      Spouse name: Not on file    Number of children: Not on file    Years of education: Not on file    Highest education level: Not on file   Occupational History    Not on file   Tobacco Use    Smoking status: Never    Smokeless tobacco: Never   Substance and Sexual Activity    Alcohol use: Yes     Comment: special occasions    Drug use: No    Sexual activity: Yes   Other Topics Concern    Not on file   Social History Narrative    Not on file     Social Determinants of Health     Financial Resource Strain: Not on file   Food Insecurity: Not on file   Transportation Needs: Not on file   Physical Activity: Not on file   Stress: Not on file   Social Connections: Not on file   Intimate Partner Violence: Not on file   Housing Stability: Not on file     Family History   Problem Relation Age of Onset    Anemia Mother     Diabetes Mother     High Blood Pressure Mother     Diabetes Father     High Blood Pressure Father              Review of Systems      Constitutional: Negative for fever or chills  HENT: Negative for sore throat   Eyes: Negative for redness   Respiratory: Negative  for dyspnea, cough   Cardiovascular: Negative for chest pain

## 2023-07-06 DIAGNOSIS — O24.414 INSULIN CONTROLLED GESTATIONAL DIABETES MELLITUS (GDM) DURING PREGNANCY, ANTEPARTUM: ICD-10-CM

## 2023-07-06 DIAGNOSIS — Z00.00 ANNUAL PHYSICAL EXAM: ICD-10-CM

## 2023-07-07 LAB
EST. AVERAGE GLUCOSE BLD GHB EST-MCNC: 102.5 MG/DL
HBA1C MFR BLD: 5.2 %
TSH SERPL DL<=0.005 MIU/L-ACNC: 1.59 UIU/ML (ref 0.27–4.2)

## 2024-08-13 ASSESSMENT — PATIENT HEALTH QUESTIONNAIRE - PHQ9
2. FEELING DOWN, DEPRESSED OR HOPELESS: NOT AT ALL
SUM OF ALL RESPONSES TO PHQ QUESTIONS 1-9: 0
1. LITTLE INTEREST OR PLEASURE IN DOING THINGS: NOT AT ALL
1. LITTLE INTEREST OR PLEASURE IN DOING THINGS: NOT AT ALL
SUM OF ALL RESPONSES TO PHQ9 QUESTIONS 1 & 2: 0
SUM OF ALL RESPONSES TO PHQ QUESTIONS 1-9: 0
SUM OF ALL RESPONSES TO PHQ9 QUESTIONS 1 & 2: 0
2. FEELING DOWN, DEPRESSED OR HOPELESS: NOT AT ALL

## 2024-08-16 ENCOUNTER — OFFICE VISIT (OUTPATIENT)
Dept: INTERNAL MEDICINE CLINIC | Age: 29
End: 2024-08-16

## 2024-08-16 VITALS
WEIGHT: 173 LBS | BODY MASS INDEX: 29.53 KG/M2 | DIASTOLIC BLOOD PRESSURE: 75 MMHG | SYSTOLIC BLOOD PRESSURE: 115 MMHG | HEART RATE: 65 BPM | RESPIRATION RATE: 17 BRPM | HEIGHT: 64 IN

## 2024-08-16 DIAGNOSIS — Z00.00 ENCOUNTER FOR WELL ADULT EXAM WITHOUT ABNORMAL FINDINGS: ICD-10-CM

## 2024-08-16 DIAGNOSIS — Z00.00 ANNUAL PHYSICAL EXAM: Primary | ICD-10-CM

## 2024-08-16 DIAGNOSIS — Z86.32 HX GESTATIONAL DIABETES: ICD-10-CM

## 2024-08-16 PROCEDURE — 81002 URINALYSIS NONAUTO W/O SCOPE: CPT | Performed by: INTERNAL MEDICINE

## 2024-08-16 PROCEDURE — 99395 PREV VISIT EST AGE 18-39: CPT | Performed by: INTERNAL MEDICINE

## 2024-08-16 ASSESSMENT — PATIENT HEALTH QUESTIONNAIRE - PHQ9
SUM OF ALL RESPONSES TO PHQ9 QUESTIONS 1 & 2: 0
2. FEELING DOWN, DEPRESSED OR HOPELESS: NOT AT ALL
SUM OF ALL RESPONSES TO PHQ QUESTIONS 1-9: 0
1. LITTLE INTEREST OR PLEASURE IN DOING THINGS: NOT AT ALL
SUM OF ALL RESPONSES TO PHQ QUESTIONS 1-9: 0

## 2024-08-16 NOTE — PROGRESS NOTES
HPI    28 y.o. 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 No pertinent past medical history.  Past Surgical History:   Procedure Laterality Date    CYSTOSCOPY Left 1/22/2022    CYSTOSCOPY, LEFT URETEROSCOPY WITH LASER LITHOTRIPSY, LEFT URETERAL STONE REMOVAL performed by Germán Taylor MD at [facility] OR     No Known Allergies  Social History     Socioeconomic History    Marital status:      Spouse name: Not on file    Number of children: Not on file    Years of education: Not on file    Highest education level: Not on file   Occupational History    Not on file   Tobacco Use    Smoking status: Never    Smokeless tobacco: Never   Substance and Sexual Activity    Alcohol use: Yes     Comment: special occasions    Drug use: No    Sexual activity: Yes   Other Topics Concern    Not on file   Social History Narrative    Not on file     Social Determinants of Health     Financial Resource Strain: Not on file   Food Insecurity: Unknown (1/22/2024)    Received from [facility]     Food Insecurities     Worried about running out of food: Not on file     Food Bought: Not on file   Transportation Needs: Unknown (1/22/2024)    Received from [facility]     Transportation     Worried about transportation: Not on file   Physical Activity: Not on file   Stress: Not on file   Social Connections: Not on file   Intimate Partner Violence: Unknown (1/22/2024)    Received from [facility]

## 2024-08-20 LAB
ALBUMIN: 4.2 G/DL (ref 3.5–5.7)
ALP BLD-CCNC: 38 IU/L (ref 35–135)
ALT SERPL-CCNC: 8 IU/L (ref 10–60)
ANION GAP SERPL CALCULATED.3IONS-SCNC: 5 MMOL/L (ref 4–16)
AST SERPL-CCNC: 11 IU/L (ref 10–40)
BASOPHILS ABSOLUTE: 0.1 THOU/MCL (ref 0–0.2)
BASOPHILS ABSOLUTE: 1 %
BILIRUB SERPL-MCNC: 0.5 MG/DL (ref 0–1.2)
BUN BLDV-MCNC: 9 MG/DL (ref 8–26)
CALCIUM SERPL-MCNC: 9 MG/DL (ref 8.5–10.4)
CHLORIDE BLD-SCNC: 105 MMOL/L (ref 98–111)
CHOLESTEROL, TOTAL: 151 MG/DL
CO2: 27 MMOL/L (ref 21–31)
CREAT SERPL-MCNC: 0.66 MG/DL (ref 0.6–1.2)
EGFR (CKD-EPI): 122 ML/MIN/1.73 M2
EOSINOPHILS ABSOLUTE: 0.2 THOU/MCL (ref 0.03–0.45)
EOSINOPHILS RELATIVE PERCENT: 3 %
ESTIMATED AVERAGE GLUCOSE: 111 MG/DL
GLUCOSE BLD-MCNC: 111 MG/DL (ref 70–99)
HBA1C MFR BLD: 5.5 % (ref 4.2–5.6)
HCT VFR BLD CALC: 41.5 % (ref 36–46)
HDLC SERPL-MCNC: 37 MG/DL
HEMOGLOBIN: 14 G/DL (ref 12–15.2)
LDL CHOLESTEROL: 99 MG/DL
LYMPHOCYTES ABSOLUTE: 3 THOU/MCL (ref 1–4)
LYMPHOCYTES RELATIVE PERCENT: 37 %
MCH RBC QN AUTO: 30.3 PG (ref 27–33)
MCHC RBC AUTO-ENTMCNC: 33.6 G/DL (ref 32–36)
MCV RBC AUTO: 90.1 FL (ref 82–97)
MONOCYTES ABSOLUTE: 0.4 THOU/MCL (ref 0.2–0.9)
MONOCYTES RELATIVE PERCENT: 5 %
NEUTROPHILS ABSOLUTE: 4.3 THOU/MCL (ref 1.8–7.7)
NONHDLC SERPL-MCNC: 114 MG/DL
PDW BLD-RTO: 13 % (ref 12.3–17)
PLATELET # BLD: 301 THOU/MCL (ref 140–375)
PMV BLD AUTO: 7.5 FL (ref 7.4–11.5)
POTASSIUM SERPL-SCNC: 4.4 MMOL/L (ref 3.6–5.1)
RBC # BLD: 4.61 MIL/MCL (ref 3.8–5.2)
SEG NEUTROPHILS: 54 %
SODIUM BLD-SCNC: 137 MMOL/L (ref 135–145)
TOTAL PROTEIN: 6.6 G/DL (ref 6–8)
TRIGL SERPL-MCNC: 73 MG/DL
TSH ULTRASENSITIVE: 2.09 MCIU/ML (ref 0.27–4.2)
URIC ACID: 3.7 MG/DL (ref 2.3–6.6)
WBC # BLD: 8 THOU/MCL (ref 3.6–10.5)

## 2024-08-21 ENCOUNTER — TELEPHONE (OUTPATIENT)
Dept: INTERNAL MEDICINE CLINIC | Age: 29
End: 2024-08-21

## 2024-08-21 NOTE — TELEPHONE ENCOUNTER
----- Message from Dr. Jg Bermudez MD sent at 8/21/2024  7:33 AM EDT -----  HDL would be low with diet issues or genetic , can bring up with daily exercise  Again would not consider any meds at your age and current reproductive age    ALT not concerning  ----- Message -----  From: Milagro Newby MA  Sent: 8/20/2024   4:01 PM EDT  To: Jg Bermudez MD    Hello, just curious if I should be concerned about my ALT and HDL being low? Thanks, Lynda

## 2024-09-22 ENCOUNTER — PATIENT MESSAGE (OUTPATIENT)
Dept: INTERNAL MEDICINE CLINIC | Age: 29
End: 2024-09-22

## 2024-09-23 ENCOUNTER — TELEPHONE (OUTPATIENT)
Dept: INTERNAL MEDICINE CLINIC | Age: 29
End: 2024-09-23

## 2024-10-02 ENCOUNTER — TELEPHONE (OUTPATIENT)
Dept: INTERNAL MEDICINE CLINIC | Age: 29
End: 2024-10-02

## 2024-10-02 NOTE — TELEPHONE ENCOUNTER
----- Message from Dr. Jg Bermudez MD sent at 10/2/2024  8:11 AM EDT -----  Not recommended due to recent delivery  ----- Message -----  From: Michaela Bartlett  Sent: 9/30/2024  11:33 AM EDT  To: Jg Bermudez MD    Just to confirm- it's the Semaglutide correct? And how much would dose be? I would like it to be sent to the [facility] on [address]. Pt is willing to pay out-of-pocket.

## 2024-10-03 ENCOUNTER — TELEPHONE (OUTPATIENT)
Dept: INTERNAL MEDICINE CLINIC | Age: 29
End: 2024-10-03

## 2024-10-03 NOTE — TELEPHONE ENCOUNTER
----- Message from Dr. Jg Bermudez MD sent at 10/2/2024  4:23 PM EDT -----  Call her  ----- Message -----  From: Michaela Bartlett  Sent: 10/2/2024  10:39 AM EDT  To: Jg Bermudez MD    Pt states she delivered a year and a half ago. Please advise.  ----- Message -----  From: Jg Bermudez MD  Sent: 10/2/2024  10:27 AM EDT  To: Michaela Bartlett    Atleast 6 months  ----- Message -----  From: Michaela Bartlett  Sent: 10/2/2024   9:15 AM EDT  To: Jg Bermudez MD    Pt informed and wants to know how long she has to wait? Please advise.  ----- Message -----  From: Jg Bermudez MD  Sent: 10/2/2024   8:11 AM EDT  To: Michaela Bartlett    Not recommended due to recent delivery  ----- Message -----  From: Michaela Bartlett  Sent: 9/30/2024  11:33 AM EDT  To: Jg Bermudez MD    Just to confirm- it's the Semaglutide correct? And how much would dose be? I would like it to be sent to the [facility] on [facility]. Pt is willing to pay out-of-pocket.

## 2024-10-08 ENCOUNTER — TELEPHONE (OUTPATIENT)
Dept: INTERNAL MEDICINE CLINIC | Age: 29
End: 2024-10-08

## 2024-10-08 NOTE — TELEPHONE ENCOUNTER
----- Message from Dr. Jg Bermudez MD sent at 10/8/2024  2:20 PM EDT -----  Discussed with pt over phone regarding initiation of semaglutide for weight loss  Pt prefers compounding pharmacy  Side effects including nausea, constipation, pancreatitis, thyroid issues discussed  Script sent  ----- Message -----  From: Michaela Bartlett  Sent: 10/3/2024  11:31 AM EDT  To: Jg Bermudez MD      ----- Message -----  From: Jg Bermudez MD  Sent: 10/2/2024   4:23 PM EDT  To: Michaela Bartlett    Call her  ----- Message -----  From: Michaela Bartlett  Sent: 10/2/2024  10:39 AM EDT  To: Jg Bermudez MD    Pt states she delivered a year and a half ago. Please advise.  ----- Message -----  From: Jg Bermudez MD  Sent: 10/2/2024  10:27 AM EDT  To: Michaela Bartlett    Atleast 6 months  ----- Message -----  From: Michaela Bartlett  Sent: 10/2/2024   9:15 AM EDT  To: Jg Bermudez MD    Pt informed and wants to know how long she has to wait? Please advise.  ----- Message -----  From: Jg Bermudez MD  Sent: 10/2/2024   8:11 AM EDT  To: Michaela Bartlett    Not recommended due to recent delivery  ----- Message -----  From: Michaela Bartlett  Sent: 9/30/2024  11:33 AM EDT  To: Jg Bermudez MD    Just to confirm- it's the Semaglutide correct? And how much would dose be? I would like it to be sent to the [facility] on [address]. Pt is willing to pay out-of-pocket.

## 2024-10-31 ENCOUNTER — PATIENT MESSAGE (OUTPATIENT)
Dept: INTERNAL MEDICINE CLINIC | Age: 29
End: 2024-10-31

## 2024-11-01 ENCOUNTER — TELEPHONE (OUTPATIENT)
Dept: INTERNAL MEDICINE CLINIC | Age: 29
End: 2024-11-01

## 2024-11-01 NOTE — TELEPHONE ENCOUNTER
----- Message from Dr. Jg Bermudez MD sent at 10/31/2024  4:37 PM EDT -----  0.5 g weekly  ----- Message -----  From: Milagro Newby MA  Sent: 10/31/2024  12:04 PM EDT  To: Jg Bermudez MD      [facility], reaching out to get a new prescription for Semiglutide. Same pharmacy, increase of dose to 0.5mg as we discussed.   I will be taking week 4 injection 0.25 mg tomorrow and will need refill for the next month.      Thank you.

## 2024-11-21 ENCOUNTER — TELEPHONE (OUTPATIENT)
Dept: INTERNAL MEDICINE CLINIC | Age: 29
End: 2024-11-21

## 2024-11-21 RX ORDER — AZITHROMYCIN 250 MG/1
250 TABLET, FILM COATED ORAL SEE ADMIN INSTRUCTIONS
Qty: 6 TABLET | Refills: 0 | Status: SHIPPED | OUTPATIENT
Start: 2024-11-21 | End: 2024-11-26

## 2024-11-21 NOTE — TELEPHONE ENCOUNTER
----- Message from Kiana GUTIERREZ sent at 11/21/2024  1:38 PM EST -----  Contact: LUPIS [phone]    ----- Message -----  From: Jg Bermudez MD  Sent: 11/21/2024   1:32 PM EST  To: Michaela Bartlett    Start on z pack if no allergies  Also try mucinex   See us if not better  ----- Message -----  From: Alphonse Long  Sent: 11/21/2024  12:02 PM EST  To: Jg Bermudez MD    Patient states for the past 3-4 days she has been experiencing; cough, nasal congestion/pressure, runny nose, sore throat, chills, headache, and green phlegm. Pt has tested negative for Covid. Pt states she has been taking Tylenol and Dayquil/Nyquil, but has not had much relief therefore would like to request a script sent to the below pharmacy. Please advise      [id number] [place], [place] [facility] [address] P [phone] - F [phone] [facility] [address]  Phone: [phone]  Fax: [phone]

## 2025-01-13 ENCOUNTER — TELEPHONE (OUTPATIENT)
Dept: INTERNAL MEDICINE CLINIC | Age: 30
End: 2025-01-13

## 2025-01-13 NOTE — TELEPHONE ENCOUNTER
----- Message from Dr. Jg Bermudez MD sent at 1/13/2025 12:52 PM EST -----  Zofran 4 mg q 6 hr prn #30  Can cut down to 0.5 mg dose  ----- Message -----  From: Kiana Ferrera  Sent: 1/13/2025   7:50 AM EST  To: Jg Bermudez MD    Hello- with the increased dose to 1mg of semiglutide, I have been feeling nauseous and have thrown up after I take the dose. Is there any way I can get a prescription for zofran as needed?

## 2025-03-18 DIAGNOSIS — O24.414 INSULIN CONTROLLED GESTATIONAL DIABETES MELLITUS (GDM) DURING PREGNANCY, ANTEPARTUM: Primary | ICD-10-CM

## 2025-05-09 ENCOUNTER — PATIENT MESSAGE (OUTPATIENT)
Dept: INTERNAL MEDICINE CLINIC | Age: 30
End: 2025-05-09

## 2025-05-09 ENCOUNTER — TELEPHONE (OUTPATIENT)
Dept: INTERNAL MEDICINE CLINIC | Age: 30
End: 2025-05-09

## 2025-05-09 DIAGNOSIS — O24.414 INSULIN CONTROLLED GESTATIONAL DIABETES MELLITUS (GDM) DURING PREGNANCY, ANTEPARTUM: ICD-10-CM

## 2025-05-09 NOTE — TELEPHONE ENCOUNTER
Left voicemail for patient to return call to find out if she needs a refill. Per [employer] refill request still need to say \"okay to compound\" but will be dispensed in pre-filled pens. There will be a short supply available for the pharmacy so a 90 day supply may not be able to be filled.

## 2025-06-05 NOTE — TELEPHONE ENCOUNTER
----- Message from Dr. Jg Bermudez MD sent at 6/5/2025 10:35 AM EDT -----  If pt willing to pay, can do [place] [facility]  ----- Message -----  From: Michaela Bartlett  Sent: 6/2/2025   3:01 PM EDT  To: Jg Bermudez MD    [facility] cannot dispense commercial doses. Other option is for pt to receive from another pharmacy or a [place] [facility]  ----- Message -----  From: Jg Bermudez MD  Sent: 6/2/2025   2:43 PM EDT  To: Michaela Bartlett    Why can't she take commercial dose   That means lying  This does not sound right  ----- Message -----  From: Michaela Bartlett  Sent: 6/2/2025   1:29 PM EDT  To: Jg Bermudez MD    For 1 mg dose, 1.2 mg or 0.8 mg strength can be sent.     Has to include okay to compound and that pt cannot take commercial dose.  ----- Message -----  From: Jg Bermudez MD  Sent: 6/2/2025  11:52 AM EDT  To: Michaela Bartlett    Call pharmacy and get details how to send script  ----- Message -----  From: Michaela Bartlett  Sent: 5/30/2025   4:30 PM EDT  To: Jg Bermudez MD    They are compounding but it cannot be commercial dose, has to be 10% more or less and requires script to state that pt cannot take commercial dose.  ----- Message -----  From: Jg Bermudez MD  Sent: 5/30/2025  12:30 PM EDT  To: Michaela Bartlett    Not sure if still compounding pharmacy is doing semaglutide  Find out  ----- Message -----  From: Milagro Newby MA  Sent: 5/30/2025  10:29 AM EDT  To: Jg Bermudez MD    Hello- my pharmacy needs an updated prescription of semiglutide from the doctor with the new dosing since the other one is on back order. Thank you so much.

## (undated) DEVICE — GOWN SIRUS NONREIN LG W/TWL: Brand: MEDLINE INDUSTRIES, INC.

## (undated) DEVICE — MEDI-VAC NON-CONDUCTIVE SUCTION TUBING: Brand: CARDINAL HEALTH

## (undated) DEVICE — INVIEW CLEAR LEGGINGS: Brand: CONVERTORS

## (undated) DEVICE — LASER FIBER FLEXIVA PULSE ID 242 BX 5

## (undated) DEVICE — CIRCUIT ANES L72IN 3L BACT AND VIR FLTR EL CONN SGL LIMB

## (undated) DEVICE — PREP SOL PVP IODINE 4%  4 OZ/BTL

## (undated) DEVICE — SOLUTION IV IRRIG 500ML 0.9% SODIUM CHL 2F7123

## (undated) DEVICE — BAG URIN STRL FOR URO CTCH SYS

## (undated) DEVICE — GLOVE ORANGE PI 8   MSG9080

## (undated) DEVICE — SOLUTION IRRIGATION STRL H2O 1000 ML UROMATIC CONTAINER

## (undated) DEVICE — NITINOL STONE RETRIEVAL BASKET: Brand: ZERO TIP

## (undated) DEVICE — GAUZE,SPONGE,4"X4",8PLY,STRL,LF,10/TRAY: Brand: MEDLINE

## (undated) DEVICE — BOWL MED L 32OZ PLAS W/ MOLD GRAD EZ OPN PEEL PCH

## (undated) DEVICE — DBD-PACK,CYSTOSCOPY,PK VI,AURORA: Brand: MEDLINE

## (undated) DEVICE — CYSTO/BLADDER IRRIGATION SET, REGULATING CLAMP

## (undated) DEVICE — GUIDEWIRE VASC STR 3 CM 0.035 INX150 CM STD NIT ZIPWIRE

## (undated) DEVICE — SYRINGE MED 10ML LUERLOCK TIP W/O SFTY DISP